# Patient Record
Sex: MALE | Race: WHITE | NOT HISPANIC OR LATINO | URBAN - METROPOLITAN AREA
[De-identification: names, ages, dates, MRNs, and addresses within clinical notes are randomized per-mention and may not be internally consistent; named-entity substitution may affect disease eponyms.]

---

## 2018-06-01 ENCOUNTER — OFFICE VISIT (OUTPATIENT)
Dept: INTERNAL MEDICINE | Facility: CLINIC | Age: 43
End: 2018-06-01

## 2018-06-01 VITALS
RESPIRATION RATE: 18 BRPM | BODY MASS INDEX: 27.9 KG/M2 | DIASTOLIC BLOOD PRESSURE: 88 MMHG | SYSTOLIC BLOOD PRESSURE: 122 MMHG | WEIGHT: 217.4 LBS | TEMPERATURE: 98.6 F | HEART RATE: 80 BPM | HEIGHT: 74 IN | OXYGEN SATURATION: 98 %

## 2018-06-01 DIAGNOSIS — E66.3 OVERWEIGHT (BMI 25.0-29.9): ICD-10-CM

## 2018-06-01 DIAGNOSIS — R00.2 HEART PALPITATIONS: ICD-10-CM

## 2018-06-01 DIAGNOSIS — R53.82 CHRONIC FATIGUE: ICD-10-CM

## 2018-06-01 DIAGNOSIS — R07.89 LEFT CHEST PRESSURE: ICD-10-CM

## 2018-06-01 DIAGNOSIS — K21.9 GASTROESOPHAGEAL REFLUX DISEASE WITHOUT ESOPHAGITIS: Primary | ICD-10-CM

## 2018-06-01 PROBLEM — K21.00 GASTROESOPHAGEAL REFLUX DISEASE WITH ESOPHAGITIS: Status: ACTIVE | Noted: 2018-06-01

## 2018-06-01 PROBLEM — J45.909 UNCOMPLICATED ASTHMA: Status: ACTIVE | Noted: 2018-06-01

## 2018-06-01 PROBLEM — Z87.442 HISTORY OF KIDNEY STONES: Status: ACTIVE | Noted: 2018-06-01

## 2018-06-01 PROBLEM — M50.30 DDD (DEGENERATIVE DISC DISEASE), CERVICAL: Status: ACTIVE | Noted: 2018-06-01

## 2018-06-01 PROBLEM — M54.12 CERVICAL RADICULOPATHY: Status: ACTIVE | Noted: 2018-06-01

## 2018-06-01 PROCEDURE — 93000 ELECTROCARDIOGRAM COMPLETE: CPT | Performed by: INTERNAL MEDICINE

## 2018-06-01 PROCEDURE — 99204 OFFICE O/P NEW MOD 45 MIN: CPT | Performed by: INTERNAL MEDICINE

## 2018-06-01 NOTE — PROGRESS NOTES
Gonzalez Denton is a 42 y.o. male, who presents with a chief complaint of   Chief Complaint   Patient presents with   • Establish Care   • Fatigue     2 x month        43 yo M here to establish care and all of his problems are new to me. He is a  at [employer] and works with [employer].     He has had had chest pains on the left side when he goes to sleep or has anxiety. He describes it as pressure. Feels like his heart is pounding.  He is fatigued as well and comes home tired and needs to nap. Work load has increased over the last 2 months. They last about 1-2 hours or less. Never worse with exertion. He does have RAD when he is sick.     He thinks that he sleeps well , but does sometime wake him up. He does have reoccurring heart burn as well when he has tacos or pizza. He takes Omeprazole as needed when he eats something that causes it to flair. He does have waterbrash occasionally.     He has DDD of his cervical spine with radiculopathy and had injections with Dr. Romero a few years that helped. He had x-rays, but no MRI.          The following portions of the patient's history were reviewed and updated as appropriate: allergies, current medications, past family history, past medical history, past social history, past surgical history and problem list.    Allergies: Patient has no known allergies.    Current Outpatient Prescriptions:   •  OMEPRAZOLE PO, Take  by mouth As Needed., Disp: , Rfl:   There are no discontinued medications.    Review of Systems   Constitutional: Positive for fatigue. Negative for chills and fever.   HENT: Negative for congestion and rhinorrhea.    Respiratory: Positive for chest tightness. Negative for cough, choking, shortness of breath and wheezing.    Cardiovascular: Positive for chest pain and palpitations.   Gastrointestinal: Negative for abdominal pain, diarrhea, nausea and vomiting.        Reflex when eating tomatoes and spicy foods like tacos     Genitourinary:  "Negative for difficulty urinating and dysuria.   Musculoskeletal: Negative for arthralgias.   Skin: Negative for rash.   Neurological: Negative for dizziness and headaches.   Hematological: Negative for adenopathy.   Psychiatric/Behavioral: Negative for sleep disturbance.             /88 (BP Location: Left arm, Patient Position: Sitting, Cuff Size: Adult)   Pulse 80   Temp 98.6 °F (37 °C) (Oral)   Resp 18   Ht 188 cm (74\")   Wt 98.6 kg (217 lb 6.4 oz)   SpO2 98%   BMI 27.91 kg/m²       Physical Exam   Constitutional: He is oriented to person, place, and time. He appears well-developed and well-nourished. No distress.   HENT:   Head: Normocephalic and atraumatic.   Right Ear: External ear normal.   Left Ear: External ear normal.   Mouth/Throat: Oropharynx is clear and moist. No oropharyngeal exudate.   Eyes: Conjunctivae are normal. Right eye exhibits no discharge. Left eye exhibits no discharge. No scleral icterus.   Neck: Neck supple.   Cardiovascular: Normal rate, regular rhythm and normal heart sounds.  Exam reveals no gallop and no friction rub.    No murmur heard.  Pulmonary/Chest: Effort normal and breath sounds normal. No respiratory distress. He has no wheezes. He has no rales.   Abdominal: Soft. Bowel sounds are normal. He exhibits no distension and no mass. There is no tenderness. There is no guarding.   Lymphadenopathy:     He has no cervical adenopathy.   Neurological: He is alert and oriented to person, place, and time.   Skin: Skin is warm. No rash noted.   Psychiatric: He has a normal mood and affect. His behavior is normal.   Nursing note and vitals reviewed.      ECG 12 Lead  Date/Time: 6/1/2018 8:54 AM  Performed by: ORLANDO MONTES  Authorized by: ORLANDO MONTES   Comparison: not compared with previous ECG   Previous ECG: no previous ECG available  Rhythm: sinus rhythm  Rate: normal  Conduction: conduction normal  ST Segments: ST segments normal  T Waves: T waves normal  QRS axis: " normal  Other: no other findings  Clinical impression: normal ECG            No results found for this or any previous visit.        Gonzalez was seen today for establish care and fatigue.    Diagnoses and all orders for this visit:    Gastroesophageal reflux disease without esophagitis  -     CBC & Differential  -     Comprehensive Metabolic Panel  -     Urinalysis With / Culture If Indicated - Urine, Clean Catch    Left chest pressure  -     ECG 12 Lead    Heart palpitations  -     ECG 12 Lead  -     CBC & Differential  -     Comprehensive Metabolic Panel  -     TSH Rfx On Abnormal To Free T4    Chronic fatigue  -     TSH Rfx On Abnormal To Free T4  -     Vitamin B12  -     Vitamin D 25 Hydroxy    Overweight (BMI 25.0-29.9)      Reviewed old records from Dr. Collins office as well as wellness check that he had at work. Lipid panel and fasting BG were normal in 2/26/18.    In terms of his chest pressure and palpations, I think this sounds as though it is related to anxiety, stress and possibly GERD. EKG is normal. Will trial PPI once per day for 6-8 weeks and check labs and see back. Hoping that this will get better with better self care. We set a plan for exercising and eat better and hoping that this will lead to better sleep. If still having, will pursue a treadmill stress test, although discussed with him today that this is low risk.     Spent 50% of 45 minutes in counseling regarding chest pain and palpitations workup as well as need for better self care for his fatigue. Will call with blood work once they are back.       Return in about 2 months (around 8/1/2018) for Recheck.    Chani Reynolds MD  06/01/2018

## 2018-06-01 NOTE — PATIENT INSTRUCTIONS
Food Choices for Gastroesophageal Reflux Disease, Adult  When you have gastroesophageal reflux disease (GERD), the foods you eat and your eating habits are very important. Choosing the right foods can help ease your discomfort.  What guidelines do I need to follow?  · Choose fruits, vegetables, whole grains, and low-fat dairy products.  · Choose low-fat meat, fish, and poultry.  · Limit fats such as oils, salad dressings, butter, nuts, and avocado.  · Keep a food diary. This helps you identify foods that cause symptoms.  · Avoid foods that cause symptoms. These may be different for everyone.  · Eat small meals often instead of 3 large meals a day.  · Eat your meals slowly, in a place where you are relaxed.  · Limit fried foods.  · Cook foods using methods other than frying.  · Avoid drinking alcohol.  · Avoid drinking large amounts of liquids with your meals.  · Avoid bending over or lying down until 2-3 hours after eating.  What foods are not recommended?  These are some foods and drinks that may make your symptoms worse:  Vegetables   Tomatoes. Tomato juice. Tomato and spaghetti sauce. Chili peppers. Onion and garlic. Horseradish.  Fruits   Oranges, grapefruit, and lemon (fruit and juice).  Meats   High-fat meats, fish, and poultry. This includes hot dogs, ribs, ham, sausage, salami, and ruff.  Dairy   Whole milk and chocolate milk. Sour cream. Cream. Butter. Ice cream. Cream cheese.  Drinks   Coffee and tea. Bubbly (carbonated) drinks or energy drinks.  Condiments   Hot sauce. Barbecue sauce.  Sweets/Desserts   Chocolate and cocoa. Donuts. Peppermint and spearmint.  Fats and Oils   High-fat foods. This includes French fries and potato chips.  Other   Vinegar. Strong spices. This includes black pepper, white pepper, red pepper, cayenne, boland powder, cloves, ginger, and chili powder.  The items listed above may not be a complete list of foods and drinks to avoid. Contact your dietitian for more information.    This information is not intended to replace advice given to you by your health care provider. Make sure you discuss any questions you have with your health care provider.  Document Released: 06/18/2013 Document Revised: 05/25/2017 Document Reviewed: 10/22/2014  Elsevier Interactive Patient Education © 2017 [employer]

## 2018-06-02 LAB
25(OH)D3+25(OH)D2 SERPL-MCNC: 21.4 NG/ML
ALBUMIN SERPL-MCNC: 4.7 G/DL (ref 3.5–5.2)
ALBUMIN/GLOB SERPL: 2.5 G/DL
ALP SERPL-CCNC: 97 U/L (ref 40–129)
ALT SERPL-CCNC: 20 U/L (ref 5–41)
APPEARANCE UR: CLEAR
AST SERPL-CCNC: 17 U/L (ref 5–40)
BACTERIA #/AREA URNS HPF: NORMAL /HPF
BASOPHILS # BLD AUTO: 0.07 10*3/MM3 (ref 0–0.2)
BASOPHILS NFR BLD AUTO: 1.1 % (ref 0–2)
BILIRUB SERPL-MCNC: 0.6 MG/DL (ref 0.2–1.2)
BILIRUB UR QL STRIP: NEGATIVE
BUN SERPL-MCNC: 17 MG/DL (ref 6–20)
BUN/CREAT SERPL: 18.3 (ref 7–25)
CALCIUM SERPL-MCNC: 9.9 MG/DL (ref 8.6–10.5)
CHLORIDE SERPL-SCNC: 103 MMOL/L (ref 98–107)
CO2 SERPL-SCNC: 29.7 MMOL/L (ref 22–29)
COLOR UR: YELLOW
CREAT SERPL-MCNC: 0.93 MG/DL (ref 0.76–1.27)
EOSINOPHIL # BLD AUTO: 0.21 10*3/MM3 (ref 0.1–0.3)
EOSINOPHIL NFR BLD AUTO: 3.3 % (ref 0–4)
EPI CELLS #/AREA URNS HPF: NORMAL /HPF
ERYTHROCYTE [DISTWIDTH] IN BLOOD BY AUTOMATED COUNT: 13.4 % (ref 11.5–14.5)
GFR SERPLBLD CREATININE-BSD FMLA CKD-EPI: 109 ML/MIN/1.73
GFR SERPLBLD CREATININE-BSD FMLA CKD-EPI: 90 ML/MIN/1.73
GLOBULIN SER CALC-MCNC: 1.9 GM/DL
GLUCOSE SERPL-MCNC: 84 MG/DL (ref 65–99)
GLUCOSE UR QL: NEGATIVE
HCT VFR BLD AUTO: 45.4 % (ref 42–52)
HGB BLD-MCNC: 15 G/DL (ref 14–18)
HGB UR QL STRIP: NEGATIVE
IMM GRANULOCYTES # BLD: 0.02 10*3/MM3 (ref 0–0.03)
IMM GRANULOCYTES NFR BLD: 0.3 % (ref 0–0.5)
KETONES UR QL STRIP: NEGATIVE
LEUKOCYTE ESTERASE UR QL STRIP: NEGATIVE
LYMPHOCYTES # BLD AUTO: 1.28 10*3/MM3 (ref 0.6–4.8)
LYMPHOCYTES NFR BLD AUTO: 20 % (ref 20–45)
MCH RBC QN AUTO: 29.1 PG (ref 27–31)
MCHC RBC AUTO-ENTMCNC: 33 G/DL (ref 31–37)
MCV RBC AUTO: 88 FL (ref 80–94)
MICRO URNS: NORMAL
MICRO URNS: NORMAL
MONOCYTES # BLD AUTO: 0.45 10*3/MM3 (ref 0–1)
MONOCYTES NFR BLD AUTO: 7 % (ref 3–8)
MUCOUS THREADS URNS QL MICRO: PRESENT /HPF
NEUTROPHILS # BLD AUTO: 4.36 10*3/MM3 (ref 1.5–8.3)
NEUTROPHILS NFR BLD AUTO: 68.3 % (ref 45–70)
NITRITE UR QL STRIP: NEGATIVE
NRBC BLD AUTO-RTO: 0 /100 WBC (ref 0–0)
PH UR STRIP: 5.5 [PH] (ref 5–7.5)
PLATELET # BLD AUTO: 200 10*3/MM3 (ref 140–500)
POTASSIUM SERPL-SCNC: 4.8 MMOL/L (ref 3.5–5.2)
PROT SERPL-MCNC: 6.6 G/DL (ref 6–8.5)
PROT UR QL STRIP: NORMAL
RBC # BLD AUTO: 5.16 10*6/MM3 (ref 4.7–6.1)
RBC #/AREA URNS HPF: NORMAL /HPF
SODIUM SERPL-SCNC: 142 MMOL/L (ref 136–145)
SP GR UR: 1.02 (ref 1–1.03)
TSH SERPL DL<=0.005 MIU/L-ACNC: 1.8 MIU/ML (ref 0.27–4.2)
URINALYSIS REFLEX: NORMAL
UROBILINOGEN UR STRIP-MCNC: 0.2 MG/DL (ref 0.2–1)
VIT B12 SERPL-MCNC: 419 PG/ML
WBC # BLD AUTO: 6.39 10*3/MM3 (ref 4.8–10.8)
WBC #/AREA URNS HPF: NORMAL /HPF

## 2018-06-06 NOTE — PROGRESS NOTES
Labs all look good including his cholesterol, blood counts, and kidney and liver function. His B12 and vitamin D were borderline low and I want him to work on eating better and can even take men's multivitamin once per day which may help get those levels in normal range. No special supplements needed at this time.

## 2018-08-03 ENCOUNTER — OFFICE VISIT (OUTPATIENT)
Dept: INTERNAL MEDICINE | Facility: CLINIC | Age: 43
End: 2018-08-03

## 2018-08-03 VITALS
HEART RATE: 71 BPM | BODY MASS INDEX: 26.95 KG/M2 | DIASTOLIC BLOOD PRESSURE: 80 MMHG | RESPIRATION RATE: 18 BRPM | OXYGEN SATURATION: 98 % | SYSTOLIC BLOOD PRESSURE: 120 MMHG | WEIGHT: 210 LBS | HEIGHT: 74 IN | TEMPERATURE: 98.6 F

## 2018-08-03 DIAGNOSIS — M47.812 FACET ARTHRITIS, DEGENERATIVE, CERVICAL SPINE: ICD-10-CM

## 2018-08-03 DIAGNOSIS — M54.2 CERVICAL MUSCLE PAIN: ICD-10-CM

## 2018-08-03 DIAGNOSIS — Z56.6 STRESS AT WORK: ICD-10-CM

## 2018-08-03 DIAGNOSIS — K21.9 GASTROESOPHAGEAL REFLUX DISEASE WITHOUT ESOPHAGITIS: Primary | ICD-10-CM

## 2018-08-03 DIAGNOSIS — M50.30 DDD (DEGENERATIVE DISC DISEASE), CERVICAL: ICD-10-CM

## 2018-08-03 PROCEDURE — 99214 OFFICE O/P EST MOD 30 MIN: CPT | Performed by: INTERNAL MEDICINE

## 2018-08-03 SDOH — HEALTH STABILITY - MENTAL HEALTH: OTHER PHYSICAL AND MENTAL STRAIN RELATED TO WORK: Z56.6

## 2018-08-03 NOTE — PROGRESS NOTES
"      Gonzalez Denton is a 42 y.o. male, who presents with a chief complaint of   Chief Complaint   Patient presents with   • Follow-up     10 wk f/u, lab results    • Heartburn       43 yo M here for follow up and doing well. He felt that the reflux medication helped some, but worse with stress still. Trying to exercise and eat better which has helped.     He does hold a lot stress in his neck lately. Saw Dr. Rea Romero several years ago and had x-rays from 2014 that showed DDD and facet arthropathy of the neck as well as disc space narrowing. Neck is \"tight\" with no tingling or numbness. No weakness. Does have pain radiating down into the top of his shoulder.          The following portions of the patient's history were reviewed and updated as appropriate: allergies, current medications, past family history, past medical history, past social history, past surgical history and problem list.    Allergies: Patient has no known allergies.    Current Outpatient Prescriptions:   •  OMEPRAZOLE PO, Take  by mouth As Needed., Disp: , Rfl:   There are no discontinued medications.    Review of Systems   Constitutional: Negative for chills, fatigue and fever.   Respiratory: Negative for cough and shortness of breath.    Gastrointestinal: Negative for abdominal pain, diarrhea and nausea.   Musculoskeletal: Positive for neck pain and neck stiffness.   Neurological: Negative for dizziness and headaches.   Psychiatric/Behavioral: The patient is nervous/anxious.              /80 (BP Location: Left arm, Patient Position: Sitting, Cuff Size: Large Adult)   Pulse 71   Temp 98.6 °F (37 °C) (Oral)   Resp 18   Ht 188 cm (74\")   Wt 95.3 kg (210 lb)   SpO2 98%   BMI 26.96 kg/m²       Physical Exam   Constitutional: He is oriented to person, place, and time. He appears well-developed and well-nourished. No distress.   HENT:   Head: Normocephalic and atraumatic.   Right Ear: External ear normal.   Left Ear: External ear normal. "   Mouth/Throat: Oropharynx is clear and moist. No oropharyngeal exudate.   Eyes: Conjunctivae are normal. Right eye exhibits no discharge. Left eye exhibits no discharge. No scleral icterus.   Neck: Neck supple.   Cardiovascular: Normal rate, regular rhythm and normal heart sounds.  Exam reveals no gallop and no friction rub.    No murmur heard.  Pulmonary/Chest: Effort normal and breath sounds normal. No respiratory distress. He has no wheezes. He has no rales.   Abdominal: He exhibits no mass.   Musculoskeletal:        Cervical back: He exhibits decreased range of motion, pain and spasm. He exhibits no tenderness and no bony tenderness.   Lymphadenopathy:     He has no cervical adenopathy.   Neurological: He is alert and oriented to person, place, and time. He displays no atrophy. No sensory deficit. He exhibits normal muscle tone. Coordination and gait normal.   Reflex Scores:       Tricep reflexes are 2+ on the right side and 2+ on the left side.       Bicep reflexes are 2+ on the right side and 2+ on the left side.       Brachioradialis reflexes are 2+ on the right side and 2+ on the left side.  Skin: Skin is warm. Capillary refill takes less than 2 seconds. No rash noted.   Psychiatric: He has a normal mood and affect. His behavior is normal.   Nursing note and vitals reviewed.        Results for orders placed or performed in visit on 06/01/18   Comprehensive Metabolic Panel   Result Value Ref Range    Glucose 84 65 - 99 mg/dL    BUN 17 6 - 20 mg/dL    Creatinine 0.93 0.76 - 1.27 mg/dL    eGFR Non African Am 90 >60 mL/min/1.73    eGFR African Am 109 >60 mL/min/1.73    BUN/Creatinine Ratio 18.3 7.0 - 25.0    Sodium 142 136 - 145 mmol/L    Potassium 4.8 3.5 - 5.2 mmol/L    Chloride 103 98 - 107 mmol/L    Total CO2 29.7 (H) 22.0 - 29.0 mmol/L    Calcium 9.9 8.6 - 10.5 mg/dL    Total Protein 6.6 6.0 - 8.5 g/dL    Albumin 4.70 3.50 - 5.20 g/dL    Globulin 1.9 gm/dL    A/G Ratio 2.5 g/dL    Total Bilirubin 0.6 0.2 -  1.2 mg/dL    Alkaline Phosphatase 97 40 - 129 U/L    AST (SGOT) 17 5 - 40 U/L    ALT (SGPT) 20 5 - 41 U/L   Urinalysis With / Culture If Indicated - Urine, Clean Catch   Result Value Ref Range    Specific Gravity, UA 1.025 1.005 - 1.030    pH, UA 5.5 5.0 - 7.5    Color, UA Yellow Yellow    Appearance, UA Clear Clear    Leukocytes, UA Negative Negative    Protein Trace Negative/Trace    Glucose, UA Negative Negative    Ketones Negative Negative    Blood, UA Negative Negative    Bilirubin, UA Negative Negative    Urobilinogen, UA 0.2 0.2 - 1.0 mg/dL    Nitrite, UA Negative Negative    Microscopic Examination Comment     MICROSCOPIC EXAMINATION See below:     Urinalysis Reflex Comment    TSH Rfx On Abnormal To Free T4   Result Value Ref Range    TSH 1.80 0.27 - 4.2 mIU/mL   Vitamin B12   Result Value Ref Range    Vitamin B-12 419 232-1,245 pg/mL   Vitamin D 25 Hydroxy   Result Value Ref Range    25 Hydroxy, Vitamin D 21.4 ng/ml   Microscopic Examination   Result Value Ref Range    WBC, UA 0-5 0 - 5 /hpf    RBC, UA 0-2 0 - 2 /hpf    Epithelial Cells (non renal) None seen 0 - 10 /hpf    Mucus, UA Present Not Estab. /hpf    Bacteria, UA None seen None seen/Few /hpf   CBC & Differential   Result Value Ref Range    WBC 6.39 4.80 - 10.80 10*3/mm3    RBC 5.16 4.70 - 6.10 10*6/mm3    Hemoglobin 15.0 14.0 - 18.0 g/dL    Hematocrit 45.4 42.0 - 52.0 %    MCV 88.0 80.0 - 94.0 fL    MCH 29.1 27.0 - 31.0 pg    MCHC 33.0 31.0 - 37.0 g/dL    RDW 13.4 11.5 - 14.5 %    Platelets 200 140 - 500 10*3/mm3    Neutrophil Rel % 68.3 45.0 - 70.0 %    Lymphocyte Rel % 20.0 20.0 - 45.0 %    Monocyte Rel % 7.0 3.0 - 8.0 %    Eosinophil Rel % 3.3 0.0 - 4.0 %    Basophil Rel % 1.1 0.0 - 2.0 %    Neutrophils Absolute 4.36 1.50 - 8.30 10*3/mm3    Lymphocytes Absolute 1.28 0.60 - 4.80 10*3/mm3    Monocytes Absolute 0.45 0.00 - 1.00 10*3/mm3    Eosinophils Absolute 0.21 0.10 - 0.30 10*3/mm3    Basophils Absolute 0.07 0.00 - 0.20 10*3/mm3    Immature  Granulocyte Rel % 0.3 0.0 - 0.5 %    Immature Grans Absolute 0.02 0.00 - 0.03 10*3/mm3    nRBC 0.0 0.0 - 0.0 /100 WBC           Gonzalez was seen today for follow-up and heartburn.    Diagnoses and all orders for this visit:    Gastroesophageal reflux disease without esophagitis    Stress at work    DDD (degenerative disc disease), cervical  -     Ambulatory Referral to Hospital Pain Management Department  -     Ambulatory Referral to Physical Therapy Evaluate and treat    Cervical muscle pain  -     Ambulatory Referral to Hospital Pain Management Department  -     Ambulatory Referral to Physical Therapy Evaluate and treat    Facet arthritis, degenerative, cervical spine  -     Ambulatory Referral to Hospital Pain Management Department  -     Ambulatory Referral to Physical Therapy Evaluate and treat      PT as well as pain management for his neck. He has seen Dr. Romero, but she doesn't see cervical spine issues anymore. X-rays are from 2014 and were reviewed and abnormal. Will up date x-rays if we need to. Sophia to arrange. PT ordered as well.     Continue to use PPI and work on stress level. He is doing better with this, but still needs to make some changes that I think will help.     Return in about 10 months (around 6/3/2019) for Annual physical, Recheck.    Chani Reynolds MD  08/03/2018

## 2019-05-30 DIAGNOSIS — Z00.00 ROUTINE ADULT HEALTH MAINTENANCE: Primary | ICD-10-CM

## 2019-05-30 DIAGNOSIS — R53.82 CHRONIC FATIGUE: ICD-10-CM

## 2019-05-30 DIAGNOSIS — Z13.29 SCREENING FOR THYROID DISORDER: ICD-10-CM

## 2019-05-30 DIAGNOSIS — Z13.220 SCREENING FOR HYPERLIPIDEMIA: ICD-10-CM

## 2019-05-31 ENCOUNTER — LAB (OUTPATIENT)
Dept: INTERNAL MEDICINE | Facility: CLINIC | Age: 44
End: 2019-05-31

## 2019-05-31 DIAGNOSIS — Z13.220 SCREENING FOR HYPERLIPIDEMIA: ICD-10-CM

## 2019-05-31 DIAGNOSIS — Z13.29 SCREENING FOR THYROID DISORDER: ICD-10-CM

## 2019-05-31 DIAGNOSIS — Z00.00 ROUTINE ADULT HEALTH MAINTENANCE: ICD-10-CM

## 2019-05-31 DIAGNOSIS — R53.82 CHRONIC FATIGUE: ICD-10-CM

## 2019-06-01 LAB
25(OH)D3+25(OH)D2 SERPL-MCNC: 20.4 NG/ML (ref 30–100)
ALBUMIN SERPL-MCNC: 4.8 G/DL (ref 3.5–5.2)
ALBUMIN/GLOB SERPL: 2.1 G/DL
ALP SERPL-CCNC: 90 U/L (ref 39–117)
ALT SERPL-CCNC: 27 U/L (ref 1–41)
APPEARANCE UR: CLEAR
AST SERPL-CCNC: 16 U/L (ref 1–40)
BACTERIA #/AREA URNS HPF: NORMAL /HPF
BASOPHILS # BLD AUTO: 0.07 10*3/MM3 (ref 0–0.2)
BASOPHILS NFR BLD AUTO: 1.1 % (ref 0–1.5)
BILIRUB SERPL-MCNC: 0.6 MG/DL (ref 0.2–1.2)
BILIRUB UR QL STRIP: NEGATIVE
BUN SERPL-MCNC: 14 MG/DL (ref 6–20)
BUN/CREAT SERPL: 16.1 (ref 7–25)
CALCIUM SERPL-MCNC: 10.3 MG/DL (ref 8.6–10.5)
CHLORIDE SERPL-SCNC: 104 MMOL/L (ref 98–107)
CHOLEST SERPL-MCNC: 192 MG/DL (ref 0–200)
CO2 SERPL-SCNC: 26.4 MMOL/L (ref 22–29)
COLOR UR: YELLOW
CREAT SERPL-MCNC: 0.87 MG/DL (ref 0.76–1.27)
EOSINOPHIL # BLD AUTO: 0.19 10*3/MM3 (ref 0–0.4)
EOSINOPHIL NFR BLD AUTO: 2.9 % (ref 0.3–6.2)
EPI CELLS #/AREA URNS HPF: NORMAL /HPF
ERYTHROCYTE [DISTWIDTH] IN BLOOD BY AUTOMATED COUNT: 13.8 % (ref 12.3–15.4)
GLOBULIN SER CALC-MCNC: 2.3 GM/DL
GLUCOSE SERPL-MCNC: 81 MG/DL (ref 65–99)
GLUCOSE UR QL: NEGATIVE
HCT VFR BLD AUTO: 45.1 % (ref 37.5–51)
HDLC SERPL-MCNC: 55 MG/DL (ref 40–60)
HGB BLD-MCNC: 14.1 G/DL (ref 13–17.7)
HGB UR QL STRIP: NEGATIVE
IMM GRANULOCYTES # BLD AUTO: 0.01 10*3/MM3 (ref 0–0.05)
IMM GRANULOCYTES NFR BLD AUTO: 0.2 % (ref 0–0.5)
KETONES UR QL STRIP: NEGATIVE
LDLC SERPL CALC-MCNC: 122 MG/DL (ref 0–100)
LDLC/HDLC SERPL: 2.22 {RATIO}
LEUKOCYTE ESTERASE UR QL STRIP: NEGATIVE
LYMPHOCYTES # BLD AUTO: 1.56 10*3/MM3 (ref 0.7–3.1)
LYMPHOCYTES NFR BLD AUTO: 23.6 % (ref 19.6–45.3)
MCH RBC QN AUTO: 28 PG (ref 26.6–33)
MCHC RBC AUTO-ENTMCNC: 31.3 G/DL (ref 31.5–35.7)
MCV RBC AUTO: 89.5 FL (ref 79–97)
MICRO URNS: NORMAL
MICRO URNS: NORMAL
MONOCYTES # BLD AUTO: 0.46 10*3/MM3 (ref 0.1–0.9)
MONOCYTES NFR BLD AUTO: 6.9 % (ref 5–12)
MUCOUS THREADS URNS QL MICRO: PRESENT /HPF
NEUTROPHILS # BLD AUTO: 4.33 10*3/MM3 (ref 1.7–7)
NEUTROPHILS NFR BLD AUTO: 65.3 % (ref 42.7–76)
NITRITE UR QL STRIP: NEGATIVE
PH UR STRIP: 5 [PH] (ref 5–7.5)
PLATELET # BLD AUTO: 197 10*3/MM3 (ref 140–450)
POTASSIUM SERPL-SCNC: 4.7 MMOL/L (ref 3.5–5.2)
PROT SERPL-MCNC: 7.1 G/DL (ref 6–8.5)
PROT UR QL STRIP: NEGATIVE
RBC # BLD AUTO: 5.04 10*6/MM3 (ref 4.14–5.8)
RBC #/AREA URNS HPF: NORMAL /HPF
SODIUM SERPL-SCNC: 141 MMOL/L (ref 136–145)
SP GR UR: 1.02 (ref 1–1.03)
TRIGL SERPL-MCNC: 75 MG/DL (ref 0–150)
TSH SERPL DL<=0.005 MIU/L-ACNC: 1.72 MIU/ML (ref 0.27–4.2)
URINALYSIS REFLEX: NORMAL
UROBILINOGEN UR STRIP-MCNC: 0.2 MG/DL (ref 0.2–1)
VIT B12 SERPL-MCNC: 443 PG/ML (ref 211–946)
VLDLC SERPL CALC-MCNC: 15 MG/DL
WBC # BLD AUTO: 6.62 10*3/MM3 (ref 3.4–10.8)
WBC #/AREA URNS HPF: NORMAL /HPF

## 2019-06-07 ENCOUNTER — OFFICE VISIT (OUTPATIENT)
Dept: INTERNAL MEDICINE | Facility: CLINIC | Age: 44
End: 2019-06-07

## 2019-06-07 VITALS
OXYGEN SATURATION: 99 % | SYSTOLIC BLOOD PRESSURE: 134 MMHG | HEIGHT: 74 IN | RESPIRATION RATE: 14 BRPM | BODY MASS INDEX: 27.34 KG/M2 | TEMPERATURE: 97.8 F | HEART RATE: 69 BPM | WEIGHT: 213 LBS | DIASTOLIC BLOOD PRESSURE: 84 MMHG

## 2019-06-07 DIAGNOSIS — E78.00 PURE HYPERCHOLESTEROLEMIA: ICD-10-CM

## 2019-06-07 DIAGNOSIS — R53.82 CHRONIC FATIGUE: ICD-10-CM

## 2019-06-07 DIAGNOSIS — R29.818 SUSPECTED SLEEP APNEA: ICD-10-CM

## 2019-06-07 DIAGNOSIS — E55.9 VITAMIN D DEFICIENCY: ICD-10-CM

## 2019-06-07 DIAGNOSIS — Z00.00 ENCOUNTER FOR ANNUAL PHYSICAL EXAM: Primary | ICD-10-CM

## 2019-06-07 PROCEDURE — 90471 IMMUNIZATION ADMIN: CPT | Performed by: INTERNAL MEDICINE

## 2019-06-07 PROCEDURE — 90715 TDAP VACCINE 7 YRS/> IM: CPT | Performed by: INTERNAL MEDICINE

## 2019-06-07 PROCEDURE — 99396 PREV VISIT EST AGE 40-64: CPT | Performed by: INTERNAL MEDICINE

## 2019-06-07 NOTE — PROGRESS NOTES
Patient Name: Gonzalez Roth is a 43 y.o. male presenting for Annual Exam (CPE, lab results)    He is feeling well other than fatigue. He does snore at night for years. Can fall asleep when he gets home because he is so exhausted. Not exercising regularly now, but is trying to be more active in the summer. Father with sleep apnea.     Eats fairly well. Drinks plenty of water.     Well Adult Physical   Patient here for a comprehensive physical exam.The patient reports problems - fatigue and snoring     Do you take any herbs or supplements that were not prescribed by a doctor? no   Are you taking calcium supplements? no   Are you taking aspirin daily? no    GChad Bertin 43 y.o. male who presents for an Annual Wellness Visit.  he has a history of   Patient Active Problem List   Diagnosis   • History of kidney stones   • DDD (degenerative disc disease), cervical   • Cervical radiculopathy   • Reactive airway disease without complication   • Gastroesophageal reflux disease without esophagitis   • Pure hypercholesterolemia        Health Habits:  Dental Exam. up to date  Eye Exam. up to date  Exercise: 0 times/week.  Current exercise activities include: none regularly    Tob use:N/A   Qualifies for lung Ca screening?N/A       The following portions of the patient's history were reviewed and updated as appropriate: allergies, current medications, past family history, past medical history, past social history, past surgical history and problem list.    Review of Systems   Constitutional: Positive for fatigue. Negative for chills and fever.   HENT: Negative for congestion and rhinorrhea.    Respiratory: Positive for apnea. Negative for cough and shortness of breath.    Cardiovascular: Positive for palpitations. Negative for chest pain.   Gastrointestinal: Negative for abdominal pain, diarrhea and nausea.   Genitourinary: Negative for difficulty urinating and dysuria.   Musculoskeletal: Negative for  "arthralgias.   Allergic/Immunologic: Negative for environmental allergies.   Neurological: Negative for dizziness and headaches.       Patient has no known allergies.      Current Outpatient Medications:   •  Multiple Vitamin (MULTI VITAMIN MENS PO), Take  by mouth Daily., Disp: , Rfl:   •  OMEPRAZOLE PO, Take  by mouth As Needed., Disp: , Rfl:     OBJECTIVE    /84 (BP Location: Left arm, Patient Position: Sitting, Cuff Size: Large Adult)   Pulse 69   Temp 97.8 °F (36.6 °C) (Oral)   Resp 14   Ht 188 cm (74\")   Wt 96.6 kg (213 lb)   SpO2 99%   BMI 27.35 kg/m²     Physical Exam   Constitutional: He is oriented to person, place, and time. He appears well-developed and well-nourished. No distress.   HENT:   Head: Normocephalic and atraumatic.   Right Ear: Hearing, tympanic membrane, external ear and ear canal normal.   Left Ear: Hearing, tympanic membrane, external ear and ear canal normal.   Nose: Nose normal.   Mouth/Throat: Uvula is midline, oropharynx is clear and moist and mucous membranes are normal. No oropharyngeal exudate, posterior oropharyngeal edema or posterior oropharyngeal erythema. Tonsils are 0 on the right. Tonsils are 0 on the left. No tonsillar exudate.   Eyes: Conjunctivae are normal. Right eye exhibits no discharge. Left eye exhibits no discharge. No scleral icterus.   Neck: Neck supple.   Cardiovascular: Normal rate, regular rhythm and normal heart sounds. Exam reveals no gallop and no friction rub.   No murmur heard.  Pulmonary/Chest: Effort normal and breath sounds normal. No respiratory distress. He has no wheezes. He has no rales.   Abdominal: Soft. Bowel sounds are normal. He exhibits no distension and no mass. There is no tenderness. There is no guarding.   Lymphadenopathy:     He has no cervical adenopathy.   Neurological: He is alert and oriented to person, place, and time. He exhibits normal muscle tone. Coordination normal.   Skin: Skin is warm. Capillary refill takes less " than 2 seconds. No rash noted.   Psychiatric: He has a normal mood and affect. His behavior is normal.   Nursing note and vitals reviewed.        ASSESSMENT AND PLAN  Tdap today and tolerated well.      I want Gonzalez to begin a progressive daily aerobic exercise program, follow a low fat, low cholesterol diet, attempt to lose weight, decrease or avoid alcohol intake, reduce salt in diet and cooking, reduce exposure to stress, improve dietary compliance, continue current healthy lifestyle patterns and return for routine annual checkups.     He wants to think about getting a sleep study which I think that he should. Want to talk to wife before committing.     Take MV for low vitamin D.      Keep working on diet and exercise for his HLD, LDL is minimally elevated. Will recheck in one year.     Gonzalez was seen today for annual exam.    Diagnoses and all orders for this visit:    Encounter for annual physical exam  -     Tdap Vaccine Greater Than or Equal To 6yo IM    Chronic fatigue    Suspected sleep apnea    Pure hypercholesterolemia    Vitamin D deficiency         Return in about 1 year (around 6/7/2020) for Recheck, Annual physical.

## 2020-06-12 ENCOUNTER — OFFICE VISIT (OUTPATIENT)
Dept: INTERNAL MEDICINE | Facility: CLINIC | Age: 45
End: 2020-06-12

## 2020-06-12 VITALS
OXYGEN SATURATION: 98 % | WEIGHT: 221 LBS | HEIGHT: 72 IN | TEMPERATURE: 97.5 F | SYSTOLIC BLOOD PRESSURE: 118 MMHG | HEART RATE: 70 BPM | BODY MASS INDEX: 29.93 KG/M2 | DIASTOLIC BLOOD PRESSURE: 82 MMHG

## 2020-06-12 DIAGNOSIS — Z00.00 ENCOUNTER FOR WELLNESS EXAMINATION IN ADULT: Primary | ICD-10-CM

## 2020-06-12 DIAGNOSIS — R06.83 SNORING: ICD-10-CM

## 2020-06-12 DIAGNOSIS — M50.30 DDD (DEGENERATIVE DISC DISEASE), CERVICAL: ICD-10-CM

## 2020-06-12 PROBLEM — E78.00 PURE HYPERCHOLESTEROLEMIA: Status: RESOLVED | Noted: 2019-06-07 | Resolved: 2020-06-12

## 2020-06-12 PROBLEM — J45.909 REACTIVE AIRWAY DISEASE WITHOUT COMPLICATION: Status: RESOLVED | Noted: 2018-06-01 | Resolved: 2020-06-12

## 2020-06-12 PROBLEM — Z87.442 HISTORY OF KIDNEY STONES: Status: RESOLVED | Noted: 2018-06-01 | Resolved: 2020-06-12

## 2020-06-12 PROCEDURE — 99396 PREV VISIT EST AGE 40-64: CPT | Performed by: NURSE PRACTITIONER

## 2020-06-12 RX ORDER — METHOCARBAMOL 500 MG/1
500 TABLET, FILM COATED ORAL 4 TIMES DAILY
Qty: 60 TABLET | Refills: 0 | Status: SHIPPED | OUTPATIENT
Start: 2020-06-12 | End: 2021-06-18 | Stop reason: SDUPTHER

## 2020-06-12 NOTE — PROGRESS NOTES
"Subjective    Gonzalez Denton is a 44 y.o. male presenting today for   Chief Complaint   Patient presents with   • Annual Exam       History of Present Illness     Gonzalez Denton presents today as a new patient to me to establish care.   Prior PCP was Chani Reynolds.  Patient Care Team:  Emely Naidu APRN as PCP - General (Family Medicine)  Dami Forbes MD as Consulting Physician (Urology)  Rea Romero MD as Consulting Physician (Anesthesiology)    Current/chronic health conditions include:    Patient Active Problem List   Diagnosis   • DDD (degenerative disc disease), cervical   • Cervical radiculopathy   • Gastroesophageal reflux disease without esophagitis         Current Outpatient Medications:   •  methocarbamol (Robaxin) 500 MG tablet, Take 1 tablet by mouth 4 (Four) Times a Day., Disp: 60 tablet, Rfl: 0  •  OMEPRAZOLE PO, Take  by mouth As Needed., Disp: , Rfl:     GERD - this has been chronic but intermittent for approx 2 yrs; takes OTC PPI PRN generally 2-3x/wk    DDD - lower cervical to mid-thoracic; intermittent; had PT in past; will use OTC NSAID and this provides some relief    Snoring - chronic; sleep study has been suggested in the past but never scheduled; no witnessed apnea; reports daytime energy levels have actually been improved since working from home w/ quarantine    New complaints today include: \"I feel pretty good.\"    Health Maintenance:  Dental - UTD  Eye Exam - UTD  Exercise - walking 1-3x/wk        The following portions of the patient's history were reviewed and updated as appropriate: allergies, current medications, problem list, past medical history, past surgical history, family history, and social history.     Review of Systems   Constitutional: Negative.    HENT: Negative.    Eyes: Negative.    Respiratory: Negative.    Cardiovascular: Negative.    Gastrointestinal: Negative.    Genitourinary: Negative.    Musculoskeletal: Positive for back pain and neck pain.   Skin: " "Negative.    Neurological: Negative.    Psychiatric/Behavioral: Positive for sleep disturbance.       Objective    Vitals:    06/12/20 1125   BP: 118/82   BP Location: Left arm   Pulse: 70   Temp: 97.5 °F (36.4 °C)   TempSrc: Oral   SpO2: 98%   Weight: 100 kg (221 lb)   Height: 182.9 cm (72\")     Body mass index is 29.97 kg/m².  Nursing notes and vitals reviewed.    Physical Exam   Constitutional: He is oriented to person, place, and time. He appears well-developed and well-nourished.   HENT:   Head: Normocephalic.   Right Ear: Hearing, tympanic membrane, external ear and ear canal normal.   Left Ear: Hearing, tympanic membrane, external ear and ear canal normal.   Nose: Nose normal. No mucosal edema or rhinorrhea.   Mouth/Throat: Uvula is midline, oropharynx is clear and moist and mucous membranes are normal. No tonsillar exudate.   Eyes: Pupils are equal, round, and reactive to light. Conjunctivae, EOM and lids are normal.   Neck: Normal range of motion. Neck supple. No thyroid mass and no thyromegaly present.   Cardiovascular: Regular rhythm, S1 normal, S2 normal and normal pulses. Exam reveals no gallop and no friction rub.   No murmur heard.  Pulmonary/Chest: Effort normal and breath sounds normal. He has no wheezes. He has no rhonchi. He has no rales.   Abdominal: Soft. Normal appearance and bowel sounds are normal. There is no hepatosplenomegaly. There is no tenderness. There is no guarding. No hernia.   Musculoskeletal: Normal range of motion. He exhibits no edema or deformity.   Lymphadenopathy:     He has no cervical adenopathy.   Neurological: He is alert and oriented to person, place, and time. He has normal strength and normal reflexes. No cranial nerve deficit or sensory deficit.   Skin: Skin is warm, dry and intact. No lesion and no rash noted.   Psychiatric: He has a normal mood and affect. His speech is normal and behavior is normal. Thought content normal. He is attentive.       Assessment and " Plan    Gonzalez was seen today for annual exam.    Diagnoses and all orders for this visit:    Encounter for wellness examination in adult  -     CBC & Differential  -     Comprehensive Metabolic Panel  -     Hepatitis C Antibody  -     Lipid Panel With / Chol / HDL Ratio  -     Thyroid Cascade Profile    DDD (degenerative disc disease), cervical  -     methocarbamol (Robaxin) 500 MG tablet; Take 1 tablet by mouth 4 (Four) Times a Day.    Snoring  -     Ambulatory Referral to Sleep Medicine      Fasting. Will have labs drawn today. Advised will send message through [employer] w/ result unless significantly abnl.  Preventative counseling completed.  Patient counseled regarding therapeutic lifestyle changes including improved nutrition, increased exercise, and weight loss.        Medications, including side effects, were discussed with the patient. Patient verbalized understanding.  The plan of care was discussed. All questions were answered. Patient verbalized understanding.        Return in about 1 year (around 6/12/2021) for Annual physical.

## 2020-06-13 LAB
ALBUMIN SERPL-MCNC: 4.8 G/DL (ref 4–5)
ALBUMIN/GLOB SERPL: 2.2 {RATIO} (ref 1.2–2.2)
ALP SERPL-CCNC: 95 IU/L (ref 39–117)
ALT SERPL-CCNC: 30 IU/L (ref 0–44)
AST SERPL-CCNC: 20 IU/L (ref 0–40)
BASOPHILS # BLD AUTO: 0.1 X10E3/UL (ref 0–0.2)
BASOPHILS NFR BLD AUTO: 1 %
BILIRUB SERPL-MCNC: 0.6 MG/DL (ref 0–1.2)
BUN SERPL-MCNC: 15 MG/DL (ref 6–24)
BUN/CREAT SERPL: 15 (ref 9–20)
CALCIUM SERPL-MCNC: 9.8 MG/DL (ref 8.7–10.2)
CHLORIDE SERPL-SCNC: 102 MMOL/L (ref 96–106)
CHOLEST SERPL-MCNC: 215 MG/DL (ref 100–199)
CHOLEST/HDLC SERPL: 3.8 RATIO (ref 0–5)
CO2 SERPL-SCNC: 27 MMOL/L (ref 20–29)
CREAT SERPL-MCNC: 0.97 MG/DL (ref 0.76–1.27)
EOSINOPHIL # BLD AUTO: 0.2 X10E3/UL (ref 0–0.4)
EOSINOPHIL NFR BLD AUTO: 3 %
ERYTHROCYTE [DISTWIDTH] IN BLOOD BY AUTOMATED COUNT: 13.3 % (ref 11.6–15.4)
GLOBULIN SER CALC-MCNC: 2.2 G/DL (ref 1.5–4.5)
GLUCOSE SERPL-MCNC: 79 MG/DL (ref 65–99)
HCT VFR BLD AUTO: 46.2 % (ref 37.5–51)
HCV AB S/CO SERPL IA: <0.1 S/CO RATIO (ref 0–0.9)
HDLC SERPL-MCNC: 56 MG/DL
HGB BLD-MCNC: 15.1 G/DL (ref 13–17.7)
IMM GRANULOCYTES # BLD AUTO: 0 X10E3/UL (ref 0–0.1)
IMM GRANULOCYTES NFR BLD AUTO: 0 %
LDLC SERPL CALC-MCNC: 135 MG/DL (ref 0–99)
LYMPHOCYTES # BLD AUTO: 1.8 X10E3/UL (ref 0.7–3.1)
LYMPHOCYTES NFR BLD AUTO: 25 %
MCH RBC QN AUTO: 28.6 PG (ref 26.6–33)
MCHC RBC AUTO-ENTMCNC: 32.7 G/DL (ref 31.5–35.7)
MCV RBC AUTO: 88 FL (ref 79–97)
MONOCYTES # BLD AUTO: 0.5 X10E3/UL (ref 0.1–0.9)
MONOCYTES NFR BLD AUTO: 7 %
NEUTROPHILS # BLD AUTO: 4.6 X10E3/UL (ref 1.4–7)
NEUTROPHILS NFR BLD AUTO: 64 %
PLATELET # BLD AUTO: 222 X10E3/UL (ref 150–450)
POTASSIUM SERPL-SCNC: 4.4 MMOL/L (ref 3.5–5.2)
PROT SERPL-MCNC: 7 G/DL (ref 6–8.5)
RBC # BLD AUTO: 5.28 X10E6/UL (ref 4.14–5.8)
SODIUM SERPL-SCNC: 141 MMOL/L (ref 134–144)
TRIGL SERPL-MCNC: 118 MG/DL (ref 0–149)
TSH SERPL DL<=0.005 MIU/L-ACNC: 1.99 UIU/ML (ref 0.45–4.5)
VLDLC SERPL CALC-MCNC: 24 MG/DL (ref 5–40)
WBC # BLD AUTO: 7.2 X10E3/UL (ref 3.4–10.8)

## 2020-06-30 ENCOUNTER — APPOINTMENT (OUTPATIENT)
Dept: SLEEP MEDICINE | Facility: HOSPITAL | Age: 45
End: 2020-06-30

## 2021-06-09 ENCOUNTER — TELEPHONE (OUTPATIENT)
Dept: INTERNAL MEDICINE | Facility: CLINIC | Age: 46
End: 2021-06-09

## 2021-06-10 DIAGNOSIS — Z00.00 ROUTINE HEALTH MAINTENANCE: Primary | ICD-10-CM

## 2021-06-12 LAB
25(OH)D3+25(OH)D2 SERPL-MCNC: 28.4 NG/ML (ref 30–100)
ALBUMIN SERPL-MCNC: 4.8 G/DL (ref 3.5–5.2)
ALBUMIN/GLOB SERPL: 2.2 G/DL
ALP SERPL-CCNC: 100 U/L (ref 39–117)
ALT SERPL-CCNC: 16 U/L (ref 1–41)
AST SERPL-CCNC: 12 U/L (ref 1–40)
BILIRUB SERPL-MCNC: 0.4 MG/DL (ref 0–1.2)
BUN SERPL-MCNC: 19 MG/DL (ref 6–20)
BUN/CREAT SERPL: 20.4 (ref 7–25)
CALCIUM SERPL-MCNC: 9.4 MG/DL (ref 8.6–10.5)
CHLORIDE SERPL-SCNC: 105 MMOL/L (ref 98–107)
CHOLEST SERPL-MCNC: 210 MG/DL (ref 0–200)
CHOLEST/HDLC SERPL: 3.75 {RATIO}
CO2 SERPL-SCNC: 24.8 MMOL/L (ref 22–29)
CREAT SERPL-MCNC: 0.93 MG/DL (ref 0.76–1.27)
ERYTHROCYTE [DISTWIDTH] IN BLOOD BY AUTOMATED COUNT: 13.9 % (ref 12.3–15.4)
GLOBULIN SER CALC-MCNC: 2.2 GM/DL
GLUCOSE SERPL-MCNC: 77 MG/DL (ref 65–99)
HCT VFR BLD AUTO: 45 % (ref 37.5–51)
HDLC SERPL-MCNC: 56 MG/DL (ref 40–60)
HGB BLD-MCNC: 14.2 G/DL (ref 13–17.7)
LDLC SERPL CALC-MCNC: 144 MG/DL (ref 0–100)
MCH RBC QN AUTO: 25.8 PG (ref 26.6–33)
MCHC RBC AUTO-ENTMCNC: 31.6 G/DL (ref 31.5–35.7)
MCV RBC AUTO: 81.7 FL (ref 79–97)
PLATELET # BLD AUTO: 248 10*3/MM3 (ref 140–450)
POTASSIUM SERPL-SCNC: 4.9 MMOL/L (ref 3.5–5.2)
PROT SERPL-MCNC: 7 G/DL (ref 6–8.5)
RBC # BLD AUTO: 5.51 10*6/MM3 (ref 4.14–5.8)
SODIUM SERPL-SCNC: 143 MMOL/L (ref 136–145)
TRIGL SERPL-MCNC: 54 MG/DL (ref 0–150)
TSH SERPL DL<=0.005 MIU/L-ACNC: 1.59 UIU/ML (ref 0.45–4.5)
VLDLC SERPL CALC-MCNC: 10 MG/DL (ref 5–40)
WBC # BLD AUTO: 6.54 10*3/MM3 (ref 3.4–10.8)

## 2021-06-18 ENCOUNTER — OFFICE VISIT (OUTPATIENT)
Dept: INTERNAL MEDICINE | Facility: CLINIC | Age: 46
End: 2021-06-18

## 2021-06-18 VITALS
RESPIRATION RATE: 18 BRPM | DIASTOLIC BLOOD PRESSURE: 76 MMHG | TEMPERATURE: 96.2 F | HEART RATE: 55 BPM | SYSTOLIC BLOOD PRESSURE: 120 MMHG | BODY MASS INDEX: 29.93 KG/M2 | OXYGEN SATURATION: 100 % | HEIGHT: 72 IN | WEIGHT: 221 LBS

## 2021-06-18 DIAGNOSIS — M50.30 DDD (DEGENERATIVE DISC DISEASE), CERVICAL: ICD-10-CM

## 2021-06-18 DIAGNOSIS — E55.9 VITAMIN D DEFICIENCY: ICD-10-CM

## 2021-06-18 DIAGNOSIS — Z00.00 ENCOUNTER FOR WELLNESS EXAMINATION IN ADULT: Primary | ICD-10-CM

## 2021-06-18 DIAGNOSIS — E78.00 PURE HYPERCHOLESTEROLEMIA: ICD-10-CM

## 2021-06-18 DIAGNOSIS — Z12.11 SCREENING FOR MALIGNANT NEOPLASM OF COLON: ICD-10-CM

## 2021-06-18 PROCEDURE — 99396 PREV VISIT EST AGE 40-64: CPT | Performed by: NURSE PRACTITIONER

## 2021-06-18 RX ORDER — METHOCARBAMOL 500 MG/1
500 TABLET, FILM COATED ORAL 4 TIMES DAILY
Qty: 60 TABLET | Refills: 1 | Status: SHIPPED | OUTPATIENT
Start: 2021-06-18 | End: 2023-02-12 | Stop reason: SDUPTHER

## 2021-06-18 NOTE — PROGRESS NOTES
"DAVID Roth is a 45 y.o. male presenting for Annual Exam    His current/chronic health conditions include:  Patient Active Problem List   Diagnosis   • DDD (degenerative disc disease), cervical   • Cervical radiculopathy   • Gastroesophageal reflux disease without esophagitis   • Pure hypercholesterolemia       Current Outpatient Medications on File Prior to Visit   Medication Sig   • OMEPRAZOLE PO Take  by mouth As Needed.   • [DISCONTINUED] methocarbamol (Robaxin) 500 MG tablet Take 1 tablet by mouth 4 (Four) Times a Day.     No current facility-administered medications on file prior to visit.            Health Habits:  Nutrition: \"I could eat better.\"  Exercise: intermittently   Current exercise activities include: walking    Screenings:  Dental Exam: UTD  Eye Exam: UTD  PSA: n/a  Colon Cancer: n/a; asymptomatic  Tob use: never      Complaints today include:  \"I feel fine. Still have my back pain that is on and off. Still my acid reflux on and off.\" Omeprazole PRN relieves this. He generally takes an OTC NSAID and stretch to relieve back pain.    The patient's allergies, current medications, problem list, past medical history, past family history, past medical history, and past social history were reviewed and updated as appropriate.    Review of Systems   Constitutional: Negative.    HENT: Negative.    Eyes: Negative.    Respiratory: Negative.    Cardiovascular: Negative.    Gastrointestinal: Negative.    Endocrine: Negative.    Genitourinary: Negative.    Musculoskeletal: Positive for back pain.   Skin: Negative.    Allergic/Immunologic: Negative.    Neurological: Negative.    Hematological: Negative.    Psychiatric/Behavioral: Negative.        OBJECTIVE    Vitals:    06/18/21 0824   BP: 120/76   Pulse: 55   Resp: 18   Temp: 96.2 °F (35.7 °C)   TempSrc: Temporal   SpO2: 100%   Weight: 100 kg (221 lb)   Height: 182.9 cm (72.01\")       BP Readings from Last 3 Encounters:   06/18/21 120/76   06/12/20 118/82 "   01/12/20 146/86       Wt Readings from Last 3 Encounters:   06/18/21 100 kg (221 lb)   06/12/20 100 kg (221 lb)   01/12/20 99.8 kg (220 lb)       Body mass index is 29.97 kg/m².  Nursing notes and vital signs reviewed.    Physical Exam  Constitutional:       General: He is not in acute distress.     Appearance: Normal appearance. He is well-developed.   HENT:      Head: Normocephalic.      Right Ear: Hearing, tympanic membrane, ear canal and external ear normal.      Left Ear: Hearing, tympanic membrane, ear canal and external ear normal.      Nose: Nose normal. No mucosal edema or rhinorrhea.      Mouth/Throat:      Mouth: Mucous membranes are moist.      Pharynx: Oropharynx is clear. Uvula midline.   Eyes:      General: Lids are normal.      Extraocular Movements: Extraocular movements intact.      Conjunctiva/sclera: Conjunctivae normal.      Pupils: Pupils are equal, round, and reactive to light.   Neck:      Thyroid: No thyroid mass or thyromegaly.   Cardiovascular:      Rate and Rhythm: Regular rhythm.      Pulses: Normal pulses.      Heart sounds: S1 normal and S2 normal. No murmur heard.   No friction rub. No gallop.    Pulmonary:      Effort: Pulmonary effort is normal.      Breath sounds: Normal breath sounds. No wheezing, rhonchi or rales.   Abdominal:      General: Bowel sounds are normal.      Palpations: Abdomen is soft.      Tenderness: There is no abdominal tenderness. There is no guarding.      Hernia: No hernia is present.   Musculoskeletal:         General: No deformity. Normal range of motion.      Cervical back: Normal range of motion and neck supple.   Lymphadenopathy:      Cervical: No cervical adenopathy.   Skin:     General: Skin is warm and dry.      Findings: No lesion or rash.   Neurological:      General: No focal deficit present.      Mental Status: He is alert and oriented to person, place, and time.      Cranial Nerves: No cranial nerve deficit.      Sensory: No sensory deficit.       Motor: Motor function is intact.      Coordination: Coordination is intact.      Gait: Gait normal.      Deep Tendon Reflexes: Reflexes are normal and symmetric.   Psychiatric:         Attention and Perception: He is attentive.         Mood and Affect: Mood and affect normal.         Speech: Speech normal.         Behavior: Behavior normal.         Thought Content: Thought content normal.           Recent Results (from the past 672 hour(s))   CBC (No Diff)    Collection Time: 06/11/21 10:43 AM    Specimen: Blood   Result Value Ref Range    WBC 6.54 3.40 - 10.80 10*3/mm3    RBC 5.51 4.14 - 5.80 10*6/mm3    Hemoglobin 14.2 13.0 - 17.7 g/dL    Hematocrit 45.0 37.5 - 51.0 %    MCV 81.7 79.0 - 97.0 fL    MCH 25.8 (L) 26.6 - 33.0 pg    MCHC 31.6 31.5 - 35.7 g/dL    RDW 13.9 12.3 - 15.4 %    Platelets 248 140 - 450 10*3/mm3   Comprehensive Metabolic Panel    Collection Time: 06/11/21 10:43 AM    Specimen: Blood   Result Value Ref Range    Glucose 77 65 - 99 mg/dL    BUN 19 6 - 20 mg/dL    Creatinine 0.93 0.76 - 1.27 mg/dL    eGFR Non African Am 88 >60 mL/min/1.73    eGFR African Am 107 >60 mL/min/1.73    BUN/Creatinine Ratio 20.4 7.0 - 25.0    Sodium 143 136 - 145 mmol/L    Potassium 4.9 3.5 - 5.2 mmol/L    Chloride 105 98 - 107 mmol/L    Total CO2 24.8 22.0 - 29.0 mmol/L    Calcium 9.4 8.6 - 10.5 mg/dL    Total Protein 7.0 6.0 - 8.5 g/dL    Albumin 4.80 3.50 - 5.20 g/dL    Globulin 2.2 gm/dL    A/G Ratio 2.2 g/dL    Total Bilirubin 0.4 0.0 - 1.2 mg/dL    Alkaline Phosphatase 100 39 - 117 U/L    AST (SGOT) 12 1 - 40 U/L    ALT (SGPT) 16 1 - 41 U/L   Lipid Panel With / Chol / HDL Ratio    Collection Time: 06/11/21 10:43 AM    Specimen: Blood   Result Value Ref Range    Total Cholesterol 210 (H) 0 - 200 mg/dL    Triglycerides 54 0 - 150 mg/dL    HDL Cholesterol 56 40 - 60 mg/dL    VLDL Cholesterol Shin 10 5 - 40 mg/dL    LDL Chol Calc ([facility]) 144 (H) 0 - 100 mg/dL    Chol/HDL Ratio 3.75    Thyroid Cascade Profile    Collection  Time: 06/11/21 10:43 AM    Specimen: Blood   Result Value Ref Range    TSH 1.590 0.450 - 4.500 uIU/mL   Vitamin D 25 Hydroxy    Collection Time: 06/11/21 10:43 AM    Specimen: Blood   Result Value Ref Range    25 Hydroxy, Vitamin D 28.4 (L) 30.0 - 100.0 ng/ml     Each of these results were discussed individually in detail with the patient.      ASSESSMENT AND PLAN    Diagnoses and all orders for this visit:    1. Encounter for wellness examination in adult (Primary)    2. Screening for malignant neoplasm of colon  Comments:  - Pt will be 46 in 3 mo. I will order cologuard now and he will complete this test after his birthday.  Orders:  -     Cologuard - Stool, Per Rectum; Future    3. Pure hypercholesterolemia  Comments:  - counseled re TLCs    4. Vitamin D deficiency  Comments:  - start QD 5000IU supp    5. DDD (degenerative disc disease), cervical  -     methocarbamol (Robaxin) 500 MG tablet; Take 1 tablet by mouth 4 (Four) Times a Day.  Dispense: 60 tablet; Refill: 1          Preventative counseling completed including relevant screenings, appropriate vaccinations, healthy nutrition, and appropriate physical activity.  Patient's Body mass index is 29.97 kg/m². indicating that he is overweight (BMI 25-29.9). Obesity-related health conditions include the following: dyslipidemias. Obesity is unchanged. BMI is is above average; BMI management plan is completed. We discussed portion control and increasing exercise..          Medications, including side effects, were discussed with the patient. Patient verbalized understanding.  The plan of care was discussed. All questions were answered. Patient verbalized understanding.        Return in about 1 year (around 6/18/2022) for Annual physical; w/ fasting labs one week prior to CPE next year., or sooner if needed.

## 2021-08-22 ENCOUNTER — HOSPITAL ENCOUNTER (EMERGENCY)
Facility: HOSPITAL | Age: 46
Discharge: HOME | End: 2021-08-22
Attending: EMERGENCY MEDICINE | Admitting: EMERGENCY MEDICINE

## 2021-08-22 ENCOUNTER — APPOINTMENT (OUTPATIENT)
Dept: CT IMAGING | Facility: HOSPITAL | Age: 46
End: 2021-08-22

## 2021-08-22 VITALS
DIASTOLIC BLOOD PRESSURE: 91 MMHG | TEMPERATURE: 97.9 F | RESPIRATION RATE: 18 BRPM | BODY MASS INDEX: 29.66 KG/M2 | HEIGHT: 72 IN | OXYGEN SATURATION: 97 % | HEART RATE: 59 BPM | SYSTOLIC BLOOD PRESSURE: 140 MMHG | WEIGHT: 219 LBS

## 2021-08-22 DIAGNOSIS — N20.0 KIDNEY STONE: Primary | ICD-10-CM

## 2021-08-22 LAB
ALBUMIN SERPL-MCNC: 4.2 G/DL (ref 3.5–5.2)
ALBUMIN/GLOB SERPL: 1.8 G/DL
ALP SERPL-CCNC: 120 U/L (ref 39–117)
ALT SERPL W P-5'-P-CCNC: 17 U/L (ref 1–41)
ANION GAP SERPL CALCULATED.3IONS-SCNC: 7.5 MMOL/L (ref 5–15)
AST SERPL-CCNC: 16 U/L (ref 1–40)
BASOPHILS # BLD AUTO: 0.1 10*3/MM3 (ref 0–0.2)
BASOPHILS NFR BLD AUTO: 0.9 % (ref 0–1.5)
BILIRUB SERPL-MCNC: 0.3 MG/DL (ref 0–1.2)
BILIRUB UR QL STRIP: NEGATIVE
BUN SERPL-MCNC: 18 MG/DL (ref 6–20)
BUN/CREAT SERPL: 16.4 (ref 7–25)
CALCIUM SPEC-SCNC: 9.4 MG/DL (ref 8.6–10.5)
CHLORIDE SERPL-SCNC: 102 MMOL/L (ref 98–107)
CLARITY UR: ABNORMAL
CO2 SERPL-SCNC: 29.5 MMOL/L (ref 22–29)
COLOR UR: YELLOW
CREAT SERPL-MCNC: 1.1 MG/DL (ref 0.76–1.27)
DEPRECATED RDW RBC AUTO: 46.3 FL (ref 37–54)
EOSINOPHIL # BLD AUTO: 0.32 10*3/MM3 (ref 0–0.4)
EOSINOPHIL NFR BLD AUTO: 2.9 % (ref 0.3–6.2)
ERYTHROCYTE [DISTWIDTH] IN BLOOD BY AUTOMATED COUNT: 14.9 % (ref 12.3–15.4)
GFR SERPL CREATININE-BSD FRML MDRD: 73 ML/MIN/1.73
GLOBULIN UR ELPH-MCNC: 2.3 GM/DL
GLUCOSE SERPL-MCNC: 115 MG/DL (ref 65–99)
GLUCOSE UR STRIP-MCNC: NEGATIVE MG/DL
HCT VFR BLD AUTO: 42.3 % (ref 37.5–51)
HGB BLD-MCNC: 13.5 G/DL (ref 13–17.7)
HGB UR QL STRIP.AUTO: NEGATIVE
IMM GRANULOCYTES # BLD AUTO: 0.02 10*3/MM3 (ref 0–0.05)
IMM GRANULOCYTES NFR BLD AUTO: 0.2 % (ref 0–0.5)
KETONES UR QL STRIP: NEGATIVE
LEUKOCYTE ESTERASE UR QL STRIP.AUTO: NEGATIVE
LYMPHOCYTES # BLD AUTO: 1.76 10*3/MM3 (ref 0.7–3.1)
LYMPHOCYTES NFR BLD AUTO: 16.2 % (ref 19.6–45.3)
MCH RBC QN AUTO: 27.2 PG (ref 26.6–33)
MCHC RBC AUTO-ENTMCNC: 31.9 G/DL (ref 31.5–35.7)
MCV RBC AUTO: 85.3 FL (ref 79–97)
MONOCYTES # BLD AUTO: 0.9 10*3/MM3 (ref 0.1–0.9)
MONOCYTES NFR BLD AUTO: 8.3 % (ref 5–12)
NEUTROPHILS NFR BLD AUTO: 7.75 10*3/MM3 (ref 1.7–7)
NEUTROPHILS NFR BLD AUTO: 71.5 % (ref 42.7–76)
NITRITE UR QL STRIP: NEGATIVE
NRBC BLD AUTO-RTO: 0 /100 WBC (ref 0–0.2)
PH UR STRIP.AUTO: 7 [PH] (ref 4.5–8)
PLATELET # BLD AUTO: 196 10*3/MM3 (ref 140–450)
PMV BLD AUTO: 12.2 FL (ref 6–12)
POTASSIUM SERPL-SCNC: 3.8 MMOL/L (ref 3.5–5.2)
PROT SERPL-MCNC: 6.5 G/DL (ref 6–8.5)
PROT UR QL STRIP: NEGATIVE
RBC # BLD AUTO: 4.96 10*6/MM3 (ref 4.14–5.8)
SODIUM SERPL-SCNC: 139 MMOL/L (ref 136–145)
SP GR UR STRIP: 1.02 (ref 1–1.03)
UROBILINOGEN UR QL STRIP: ABNORMAL
WBC # BLD AUTO: 10.85 10*3/MM3 (ref 3.4–10.8)

## 2021-08-22 PROCEDURE — 80053 COMPREHEN METABOLIC PANEL: CPT | Performed by: EMERGENCY MEDICINE

## 2021-08-22 PROCEDURE — 96375 TX/PRO/DX INJ NEW DRUG ADDON: CPT

## 2021-08-22 PROCEDURE — 99283 EMERGENCY DEPT VISIT LOW MDM: CPT | Performed by: EMERGENCY MEDICINE

## 2021-08-22 PROCEDURE — 74176 CT ABD & PELVIS W/O CONTRAST: CPT

## 2021-08-22 PROCEDURE — 25010000002 MORPHINE PER 10 MG: Performed by: EMERGENCY MEDICINE

## 2021-08-22 PROCEDURE — 81003 URINALYSIS AUTO W/O SCOPE: CPT | Performed by: EMERGENCY MEDICINE

## 2021-08-22 PROCEDURE — 96374 THER/PROPH/DIAG INJ IV PUSH: CPT

## 2021-08-22 PROCEDURE — 25010000002 ONDANSETRON PER 1 MG: Performed by: EMERGENCY MEDICINE

## 2021-08-22 PROCEDURE — 25010000002 HYDROMORPHONE PER 4 MG: Performed by: EMERGENCY MEDICINE

## 2021-08-22 PROCEDURE — 25010000002 KETOROLAC TROMETHAMINE PER 15 MG: Performed by: EMERGENCY MEDICINE

## 2021-08-22 PROCEDURE — 99283 EMERGENCY DEPT VISIT LOW MDM: CPT

## 2021-08-22 PROCEDURE — 85025 COMPLETE CBC W/AUTO DIFF WBC: CPT | Performed by: EMERGENCY MEDICINE

## 2021-08-22 RX ORDER — HYDROMORPHONE HCL 110MG/55ML
0.5 PATIENT CONTROLLED ANALGESIA SYRINGE INTRAVENOUS ONCE
Status: COMPLETED | OUTPATIENT
Start: 2021-08-22 | End: 2021-08-22

## 2021-08-22 RX ORDER — SODIUM CHLORIDE 0.9 % (FLUSH) 0.9 %
10 SYRINGE (ML) INJECTION AS NEEDED
Status: DISCONTINUED | OUTPATIENT
Start: 2021-08-22 | End: 2021-08-22 | Stop reason: HOSPADM

## 2021-08-22 RX ORDER — HYDROMORPHONE HCL 110MG/55ML
1 PATIENT CONTROLLED ANALGESIA SYRINGE INTRAVENOUS ONCE
Status: DISCONTINUED | OUTPATIENT
Start: 2021-08-22 | End: 2021-08-22

## 2021-08-22 RX ORDER — ONDANSETRON 4 MG/1
4 TABLET, FILM COATED ORAL EVERY 8 HOURS PRN
Qty: 20 TABLET | Refills: 0 | Status: SHIPPED | OUTPATIENT
Start: 2021-08-22 | End: 2021-08-29

## 2021-08-22 RX ORDER — HYDROCODONE BITARTRATE AND ACETAMINOPHEN 5; 325 MG/1; MG/1
1 TABLET ORAL EVERY 8 HOURS PRN
Qty: 15 TABLET | Refills: 0 | Status: SHIPPED | OUTPATIENT
Start: 2021-08-22 | End: 2021-08-27

## 2021-08-22 RX ORDER — TAMSULOSIN HYDROCHLORIDE 0.4 MG/1
1 CAPSULE ORAL DAILY
Qty: 5 CAPSULE | Refills: 0 | Status: SHIPPED | OUTPATIENT
Start: 2021-08-22 | End: 2021-08-27

## 2021-08-22 RX ORDER — KETOROLAC TROMETHAMINE 30 MG/ML
30 INJECTION, SOLUTION INTRAMUSCULAR; INTRAVENOUS ONCE
Status: COMPLETED | OUTPATIENT
Start: 2021-08-22 | End: 2021-08-22

## 2021-08-22 RX ORDER — HYDROCODONE BITARTRATE AND ACETAMINOPHEN 5; 325 MG/1; MG/1
1 TABLET ORAL EVERY 6 HOURS PRN
COMMUNITY
End: 2021-09-23

## 2021-08-22 RX ORDER — ONDANSETRON 2 MG/ML
4 INJECTION INTRAMUSCULAR; INTRAVENOUS ONCE
Status: COMPLETED | OUTPATIENT
Start: 2021-08-22 | End: 2021-08-22

## 2021-08-22 RX ADMIN — KETOROLAC TROMETHAMINE 30 MG: 30 INJECTION, SOLUTION INTRAMUSCULAR; INTRAVENOUS at 02:21

## 2021-08-22 RX ADMIN — HYDROMORPHONE HYDROCHLORIDE 0.5 MG: 2 INJECTION, SOLUTION INTRAMUSCULAR; INTRAVENOUS; SUBCUTANEOUS at 03:35

## 2021-08-22 RX ADMIN — ONDANSETRON 4 MG: 2 INJECTION INTRAMUSCULAR; INTRAVENOUS at 02:21

## 2021-08-22 RX ADMIN — MORPHINE SULFATE 4 MG: 4 INJECTION INTRAVENOUS at 02:21

## 2021-08-22 NOTE — ED PROVIDER NOTES
Subjective   History of Present Illness  History of Present Illness    Chief complaint: Flank pain, nausea    Location: Right flank area    Quality/Severity: Moderate sharp pain    Timing/Duration: Started this evening around 11 PM    Modifying Factors: None    Narrative: This patient presents for evaluation of new onset right-sided flank pain with some nausea.  He has had several kidney stones in the past and he says this feels very similar to kidney stone problems he is experienced before.  He has been cared for by doctors and the [facility] has required lithotripsy in the past.  He has not had any vomiting so far.  He has not had any fevers or chills either.  He has not experienced any hematuria get either.    Associated Symptoms: As above    Review of Systems   Constitutional: Negative for activity change, diaphoresis and fever.   HENT: Negative.    Respiratory: Negative for cough and shortness of breath.    Cardiovascular: Negative for chest pain.   Gastrointestinal: Positive for nausea. Negative for abdominal pain and vomiting.   Genitourinary: Positive for flank pain. Negative for dysuria, frequency and hematuria.   Skin: Negative for color change.   Neurological: Negative for syncope and headaches.   All other systems reviewed and are negative.      Past Medical History:   Diagnosis Date   • GERD (gastroesophageal reflux disease)    • History of kidney stones 6/1/2018    5mm stone that caused hydronephrosis requiring lithotripsy and uretal stent placement 2008 and multiple ones since that time requiring lithotripsy. He sees Dr. Forbes once per year    • Kidney stone        No Known Allergies    Past Surgical History:   Procedure Laterality Date   • ADENOIDECTOMY     • CYSTOSCOPY W/ LASER LITHOTRIPSY      Sees Dr. Forbes yearly    • TONSILLECTOMY     • VASECTOMY         Family History   Problem Relation Age of Onset   • Hypertension Mother    • Ovarian cancer Mother 54   • Hypertension  Father    • Gout Father    • Asthma Father    • Depression Father    • Alcohol abuse Brother    • Mental illness Brother    • Esophageal cancer Paternal Uncle    • Skin cancer Maternal Grandmother    • Heart failure Maternal Grandmother    • Asthma Paternal Grandmother    • Diabetes Paternal Grandfather    • Stroke Paternal Grandfather    • Mental illness Brother    • Pancreatic cancer Maternal Grandfather    • Diabetes type I Son        Social History     Socioeconomic History   • Marital status:      Spouse name: Not on file   • Number of children: Not on file   • Years of education: Not on file   • Highest education level: Not on file   Tobacco Use   • Smoking status: Never Smoker   • Smokeless tobacco: Never Used   Substance and Sexual Activity   • Alcohol use: Yes     Alcohol/week: 1.0 standard drinks     Types: 1 Standard drinks or equivalent per week     Comment: occ   • Drug use: Defer   • Sexual activity: Yes       ED Triage Vitals [08/22/21 0213]   Temp Heart Rate Resp BP SpO2   97.9 °F (36.6 °C) 63 20 (!) 152/103 95 %      Temp src Heart Rate Source Patient Position BP Location FiO2 (%)   Oral Monitor Lying Right arm --         Objective   Physical Exam  Vitals and nursing note reviewed.   Constitutional:       Appearance: He is well-developed. He is not toxic-appearing or diaphoretic.      Comments: Appears uncomfortable but not in significant distress.   HENT:      Head: Normocephalic and atraumatic.   Eyes:      General:         Right eye: No discharge.         Left eye: No discharge.      Pupils: Pupils are equal, round, and reactive to light.   Cardiovascular:      Rate and Rhythm: Normal rate and regular rhythm.      Pulses: Normal pulses.   Pulmonary:      Effort: Pulmonary effort is normal. No respiratory distress.      Breath sounds: No stridor.   Abdominal:      Tenderness: There is right CVA tenderness. There is no guarding or rebound.   Musculoskeletal:         General: No deformity.  Normal range of motion.      Cervical back: Normal range of motion and neck supple.      Right lower leg: No edema.      Left lower leg: No edema.   Skin:     General: Skin is warm and dry.      Findings: No erythema or rash.   Neurological:      General: No focal deficit present.      Mental Status: He is alert and oriented to person, place, and time.   Psychiatric:         Behavior: Behavior normal.         Thought Content: Thought content normal.         Judgment: Judgment normal.       Results for orders placed or performed during the hospital encounter of 08/22/21   Urinalysis With Culture If Indicated - Urine, Clean Catch    Specimen: Urine, Clean Catch   Result Value Ref Range    Color, UA Yellow Yellow, Straw    Appearance, UA Cloudy (A) Clear    pH, UA 7.0 4.5 - 8.0    Specific Gravity, UA 1.020 1.003 - 1.030    Glucose, UA Negative Negative    Ketones, UA Negative Negative    Bilirubin, UA Negative Negative    Blood, UA Negative Negative    Protein, UA Negative Negative    Leuk Esterase, UA Negative Negative    Nitrite, UA Negative Negative    Urobilinogen, UA 0.2 E.U./dL 0.2 - 1.0 E.U./dL   Comprehensive Metabolic Panel    Specimen: Blood   Result Value Ref Range    Glucose 115 (H) 65 - 99 mg/dL    BUN 18 6 - 20 mg/dL    Creatinine 1.10 0.76 - 1.27 mg/dL    Sodium 139 136 - 145 mmol/L    Potassium 3.8 3.5 - 5.2 mmol/L    Chloride 102 98 - 107 mmol/L    CO2 29.5 (H) 22.0 - 29.0 mmol/L    Calcium 9.4 8.6 - 10.5 mg/dL    Total Protein 6.5 6.0 - 8.5 g/dL    Albumin 4.20 3.50 - 5.20 g/dL    ALT (SGPT) 17 1 - 41 U/L    AST (SGOT) 16 1 - 40 U/L    Alkaline Phosphatase 120 (H) 39 - 117 U/L    Total Bilirubin 0.3 0.0 - 1.2 mg/dL    eGFR Non African Amer 73 >60 mL/min/1.73    Globulin 2.3 gm/dL    A/G Ratio 1.8 g/dL    BUN/Creatinine Ratio 16.4 7.0 - 25.0    Anion Gap 7.5 5.0 - 15.0 mmol/L   CBC Auto Differential    Specimen: Blood   Result Value Ref Range    WBC 10.85 (H) 3.40 - 10.80 10*3/mm3    RBC 4.96 4.14 -  "5.80 10*6/mm3    Hemoglobin 13.5 13.0 - 17.7 g/dL    Hematocrit 42.3 37.5 - 51.0 %    MCV 85.3 79.0 - 97.0 fL    MCH 27.2 26.6 - 33.0 pg    MCHC 31.9 31.5 - 35.7 g/dL    RDW 14.9 12.3 - 15.4 %    RDW-SD 46.3 37.0 - 54.0 fl    MPV 12.2 (H) 6.0 - 12.0 fL    Platelets 196 140 - 450 10*3/mm3    Neutrophil % 71.5 42.7 - 76.0 %    Lymphocyte % 16.2 (L) 19.6 - 45.3 %    Monocyte % 8.3 5.0 - 12.0 %    Eosinophil % 2.9 0.3 - 6.2 %    Basophil % 0.9 0.0 - 1.5 %    Immature Grans % 0.2 0.0 - 0.5 %    Neutrophils, Absolute 7.75 (H) 1.70 - 7.00 10*3/mm3    Lymphocytes, Absolute 1.76 0.70 - 3.10 10*3/mm3    Monocytes, Absolute 0.90 0.10 - 0.90 10*3/mm3    Eosinophils, Absolute 0.32 0.00 - 0.40 10*3/mm3    Basophils, Absolute 0.10 0.00 - 0.20 10*3/mm3    Immature Grans, Absolute 0.02 0.00 - 0.05 10*3/mm3    nRBC 0.0 0.0 - 0.2 /100 WBC       RADIOLOGY        Study: CT abdomen and pelvis without contrast    Findings: 1. Mild right hydronephrosis due to a 2 mm stone in the distal ureter just above the UVJ.  2. Tiny bilateral nonobstructing renal stones.  3. Potential mild small bowel ileus. The colon and appendix are normal.       Interpreted contemporaneously with treatment by Dr. Contreras, independently viewed by me    Procedures           ED Course  ED Course as of Aug 22 0634   Sun Aug 22, 2021   0632 I reviewed the labs and CT from today's visit.  CT demonstrates a 2 mm stone just above the UVJ.  This explains the patient's sudden pain perfectly well tonight.  He is feeling better after some IV medications here.  I expect that this stone should pass spontaneously without much difficulty.  Will prescribe for him the typical combination of Norco and Zofran and Flomax for the next several days.  Will advise him to follow-up with his already established urologist provider this week.  Will review with him the usual \"return to ER\" instructions as well.    [XU]      ED Course User Index  [XU] Kobe Hawkins MD                          "                  MDM  Number of Diagnoses or Management Options     Amount and/or Complexity of Data Reviewed  Clinical lab tests: reviewed and ordered  Tests in the radiology section of CPT®: reviewed and ordered  Independent visualization of images, tracings, or specimens: yes    Risk of Complications, Morbidity, and/or Mortality  Presenting problems: moderate  Diagnostic procedures: moderate  Management options: moderate        Final diagnoses:   Kidney stone       ED Disposition  ED Disposition     ED Disposition Condition Comment    Discharge Stable           [facility]    Call in 1 day  Call your urology doctor tomorrow to schedule a prompt follow-up appointment for further evaluation as we discussed.         Medication List      New Prescriptions    ondansetron 4 MG tablet  Commonly known as: ZOFRAN  Take 1 tablet by mouth Every 8 (Eight) Hours As Needed for Nausea or Vomiting for up to 7 days.     tamsulosin 0.4 MG capsule 24 hr capsule  Commonly known as: FLOMAX  Take 1 capsule by mouth Daily for 5 days.        Changed    * HYDROcodone-acetaminophen 5-325 MG per tablet  Commonly known as: NORCO  What changed: Another medication with the same name was added. Make sure you understand how and when to take each.     * HYDROcodone-acetaminophen 5-325 MG per tablet  Commonly known as: NORCO  Take 1 tablet by mouth Every 8 (Eight) Hours As Needed for Severe Pain  for up to 5 days.  What changed: You were already taking a medication with the same name, and this prescription was added. Make sure you understand how and when to take each.         * This list has 2 medication(s) that are the same as other medications prescribed for you. Read the directions carefully, and ask your doctor or other care provider to review them with you.               Where to Get Your Medications      These medications were sent to [facility] [address] [place] [address] [address] [facility] - [phone] PH - [phone]   [address] [address]    Phone: [phone]   · HYDROcodone-acetaminophen 5-325 MG per tablet  · ondansetron 4 MG tablet  · tamsulosin 0.4 MG capsule 24 hr capsule          Kobe Hawkins MD  08/22/21 0678

## 2021-08-25 ENCOUNTER — HOSPITAL ENCOUNTER (OUTPATIENT)
Dept: ULTRASOUND IMAGING | Facility: HOSPITAL | Age: 46
Discharge: HOME | End: 2021-08-25
Admitting: NURSE PRACTITIONER

## 2021-08-25 ENCOUNTER — OFFICE VISIT (OUTPATIENT)
Dept: INTERNAL MEDICINE | Facility: CLINIC | Age: 46
End: 2021-08-25

## 2021-08-25 VITALS
TEMPERATURE: 98.4 F | DIASTOLIC BLOOD PRESSURE: 80 MMHG | BODY MASS INDEX: 29.99 KG/M2 | WEIGHT: 221.4 LBS | HEIGHT: 72 IN | SYSTOLIC BLOOD PRESSURE: 132 MMHG | OXYGEN SATURATION: 98 % | HEART RATE: 102 BPM

## 2021-08-25 DIAGNOSIS — M25.572 ACUTE LEFT ANKLE PAIN: Primary | ICD-10-CM

## 2021-08-25 DIAGNOSIS — R60.0 EDEMA OF LEFT LOWER EXTREMITY: ICD-10-CM

## 2021-08-25 DIAGNOSIS — M25.572 ACUTE LEFT ANKLE PAIN: ICD-10-CM

## 2021-08-25 PROCEDURE — 99213 OFFICE O/P EST LOW 20 MIN: CPT | Performed by: NURSE PRACTITIONER

## 2021-08-25 PROCEDURE — 93971 EXTREMITY STUDY: CPT

## 2021-08-25 NOTE — PROGRESS NOTES
"Chief Complaint   Patient presents with   • Nephrolithiasis     low grade fever 99.9 last night with chills   • Ankle Pain   • Fever       Subjective     Gonzalez Denton is a 45 y.o. male being seen for an acute visit for left ankle pain since yesterday morning. He is reporting \"sharp pain on top of foot.\" He woke up with this after being treated for a kidney stone. Associated swelling and discoloration. He denies any known injury. He tried advil. The pain worse with any weight bearing, but also occurs at rest.       He was recently treated in the ER for a right 2mm kidney stone and he was sent home on Lortab. He is followed by Dr. Forbes. He denies difficulty urinating, blood in urine.   ED with Kobe Hawkins MD (08/22/2021)      History of Present Illness     No Known Allergies      Current Outpatient Medications:   •  HYDROcodone-acetaminophen (NORCO) 5-325 MG per tablet, Take 1 tablet by mouth Every 8 (Eight) Hours As Needed for Severe Pain  for up to 5 days., Disp: 15 tablet, Rfl: 0  •  methocarbamol (Robaxin) 500 MG tablet, Take 1 tablet by mouth 4 (Four) Times a Day., Disp: 60 tablet, Rfl: 1  •  OMEPRAZOLE PO, Take  by mouth As Needed., Disp: , Rfl:   •  ondansetron (ZOFRAN) 4 MG tablet, Take 1 tablet by mouth Every 8 (Eight) Hours As Needed for Nausea or Vomiting for up to 7 days., Disp: 20 tablet, Rfl: 0  •  tamsulosin (FLOMAX) 0.4 MG capsule 24 hr capsule, Take 1 capsule by mouth Daily for 5 days., Disp: 5 capsule, Rfl: 0  •  HYDROcodone-acetaminophen (NORCO) 5-325 MG per tablet, Take 1 tablet by mouth Every 6 (Six) Hours As Needed., Disp: , Rfl:     The following portions of the patient's history were reviewed and updated as appropriate: allergies, current medications, past family history, past medical history, past social history, past surgical history and problem list.    Review of Systems   Constitutional: Negative.    HENT: Negative.    Eyes: Negative.  Negative for visual disturbance.   Respiratory: " Negative.    Cardiovascular: Negative.  Negative for chest pain and leg swelling.   Gastrointestinal: Negative.    Endocrine: Negative.    Genitourinary: Negative.    Musculoskeletal: Positive for arthralgias.   Skin: Negative.    Allergic/Immunologic: Negative.  Negative for environmental allergies, food allergies and immunocompromised state.   Neurological: Negative.  Negative for tremors and weakness.   Hematological: Negative.  Negative for adenopathy. Does not bruise/bleed easily.   Psychiatric/Behavioral: Negative.    All other systems reviewed and are negative.      Assessment     Physical Exam  Vitals reviewed.   Constitutional:       Appearance: Normal appearance. He is not ill-appearing.   HENT:      Head: Normocephalic.   Cardiovascular:      Rate and Rhythm: Normal rate and regular rhythm.      Pulses: Normal pulses.      Heart sounds: Normal heart sounds. No murmur heard.     Pulmonary:      Effort: Pulmonary effort is normal.      Breath sounds: Normal breath sounds.   Musculoskeletal:         General: No swelling.      Left lower le+ Edema present.      Left foot: Decreased range of motion. Normal capillary refill. Tenderness (anterior) present. Normal pulse.      Comments: Left anterior ankle with severe pain to touch. Associated erythema and 1 plus edema.    Skin:     General: Skin is warm and dry.   Neurological:      General: No focal deficit present.      Mental Status: He is alert and oriented to person, place, and time.   Psychiatric:         Mood and Affect: Mood normal.         Behavior: Behavior normal.         Thought Content: Thought content normal.         Plan     His ER records were reviewed.     Diagnoses and all orders for this visit:    1. Acute left ankle pain (Primary)  -     Uric acid  -     Comprehensive metabolic panel  -     US venous doppler lower extremity left (duplex); Future    2. Edema of left lower extremity  -     Cancel: US venous doppler lower extremity left  (duplex); Future  -     US venous doppler lower extremity left (duplex); Future    Eval for DVT and gout. Treat with Heat, NSAIDs, and elevation    Will call with results as soon as completed.

## 2021-08-26 DIAGNOSIS — M10.072 ACUTE IDIOPATHIC GOUT OF LEFT ANKLE: Primary | ICD-10-CM

## 2021-08-26 LAB
ALBUMIN SERPL-MCNC: 4.4 G/DL (ref 3.5–5.2)
ALBUMIN/GLOB SERPL: 1.9 G/DL
ALP SERPL-CCNC: 117 U/L (ref 39–117)
ALT SERPL-CCNC: 15 U/L (ref 1–41)
AST SERPL-CCNC: 15 U/L (ref 1–40)
BILIRUB SERPL-MCNC: 0.9 MG/DL (ref 0–1.2)
BUN SERPL-MCNC: 17 MG/DL (ref 6–20)
BUN/CREAT SERPL: 10.4 (ref 7–25)
CALCIUM SERPL-MCNC: 9.8 MG/DL (ref 8.6–10.5)
CHLORIDE SERPL-SCNC: 105 MMOL/L (ref 98–107)
CO2 SERPL-SCNC: 27.1 MMOL/L (ref 22–29)
CREAT SERPL-MCNC: 1.64 MG/DL (ref 0.76–1.27)
GLOBULIN SER CALC-MCNC: 2.3 GM/DL
GLUCOSE SERPL-MCNC: 73 MG/DL (ref 65–99)
POTASSIUM SERPL-SCNC: 4.4 MMOL/L (ref 3.5–5.2)
PROT SERPL-MCNC: 6.7 G/DL (ref 6–8.5)
SODIUM SERPL-SCNC: 143 MMOL/L (ref 136–145)
URATE SERPL-MCNC: 8 MG/DL (ref 3.4–7)

## 2021-08-26 RX ORDER — COLCHICINE 0.6 MG/1
TABLET ORAL
Qty: 3 TABLET | Refills: 0 | Status: SHIPPED | OUTPATIENT
Start: 2021-08-26 | End: 2021-09-23

## 2021-09-09 ENCOUNTER — TELEPHONE (OUTPATIENT)
Dept: INTERNAL MEDICINE | Facility: CLINIC | Age: 46
End: 2021-09-09

## 2021-09-13 NOTE — TELEPHONE ENCOUNTER
Provider: MARGARITA KNIGHT  Caller: BRYAN NICKERSON  Relationship to Patient: PATIENT  Phone Number: [phone]  Reason for Call: HUB READ MESSAGE TO THE PATIENT.  HE STATED HE WAS ABLE TO GET THE MEDICATION AT [facility] WITH A DISCOUNT CARD AND HAS ALREADY TAKEN IT.

## 2021-09-23 ENCOUNTER — OFFICE VISIT (OUTPATIENT)
Dept: INTERNAL MEDICINE | Facility: CLINIC | Age: 46
End: 2021-09-23

## 2021-09-23 VITALS
RESPIRATION RATE: 16 BRPM | SYSTOLIC BLOOD PRESSURE: 132 MMHG | HEIGHT: 72 IN | OXYGEN SATURATION: 99 % | TEMPERATURE: 98.2 F | BODY MASS INDEX: 29.91 KG/M2 | WEIGHT: 220.8 LBS | HEART RATE: 81 BPM | DIASTOLIC BLOOD PRESSURE: 82 MMHG

## 2021-09-23 DIAGNOSIS — M10.072 IDIOPATHIC GOUT OF LEFT ANKLE, UNSPECIFIED CHRONICITY: Primary | ICD-10-CM

## 2021-09-23 PROCEDURE — 99213 OFFICE O/P EST LOW 20 MIN: CPT | Performed by: NURSE PRACTITIONER

## 2021-09-23 NOTE — PROGRESS NOTES
"Subjective   Gonzalez Denton is a 45 y.o. male presenting today for follow up of   Chief Complaint   Patient presents with   • gout     follow up X 4 weeks        History of Present Illness     Patient Active Problem List   Diagnosis   • DDD (degenerative disc disease), cervical   • Cervical radiculopathy   • Gastroesophageal reflux disease without esophagitis   • Pure hypercholesterolemia       Current Outpatient Medications on File Prior to Visit   Medication Sig   • methocarbamol (Robaxin) 500 MG tablet Take 1 tablet by mouth 4 (Four) Times a Day.   • OMEPRAZOLE PO Take  by mouth As Needed.   • [DISCONTINUED] colchicine 0.6 MG tablet Take 2 tablets  by mouth now and repeat 1 tablet by mouth in 1 hr   • [DISCONTINUED] HYDROcodone-acetaminophen (NORCO) 5-325 MG per tablet Take 1 tablet by mouth Every 6 (Six) Hours As Needed.     No current facility-administered medications on file prior to visit.      Mr. Denton had a recent new dx of Gout per my partner NP Abi. His pain and swelling have improved w/ colchicine.    The following portions of the patient's history were reviewed and updated as appropriate: allergies, current medications, past family history, past medical history, past social history, past surgical history and problem list.        Objective   Vitals:    09/23/21 1534   BP: 132/82   Pulse: 81   Resp: 16   Temp: 98.2 °F (36.8 °C)   TempSrc: Temporal   SpO2: 99%   Weight: 100 kg (220 lb 12.8 oz)   Height: 182.9 cm (72.01\")       BP Readings from Last 3 Encounters:   09/23/21 132/82   08/25/21 132/80   08/22/21 140/91        Wt Readings from Last 3 Encounters:   09/23/21 100 kg (220 lb 12.8 oz)   08/25/21 100 kg (221 lb 6.4 oz)   08/22/21 99.3 kg (219 lb)        Body mass index is 29.94 kg/m².  Nursing notes and vitals reviewed.    Physical Exam  Constitutional:       General: He is not in acute distress.  Pulmonary:      Effort: Pulmonary effort is normal. No respiratory distress.   Neurological:      Mental " Status: He is alert and oriented to person, place, and time.   Psychiatric:         Speech: Speech normal.         Thought Content: Thought content normal.         No results found for this or any previous visit (from the past 672 hour(s)).      Assessment/Plan   Diagnoses and all orders for this visit:    1. Idiopathic gout of left ankle, unspecified chronicity (Primary)  Comments:  - discussed intitation of Allopurinol or Uloric pending today's lab results  Orders:  -     Comprehensive Metabolic Panel  -     Uric Acid      Counseled regarding TLCs.         Medications, including side effects, were discussed with the patient. Patient verbalized understanding.  The plan of care was discussed. All questions were answered. Patient verbalized understanding.

## 2021-09-23 NOTE — PATIENT INSTRUCTIONS
Gout    Gout is painful swelling of your joints. Gout is a type of arthritis. It is caused by having too much uric acid in your body. Uric acid is a chemical that is made when your body breaks down substances called purines. If your body has too much uric acid, sharp crystals can form and build up in your joints. This causes pain and swelling.  Gout attacks can happen quickly and be very painful (acute gout). Over time, the attacks can affect more joints and happen more often (chronic gout).  What are the causes?  · Too much uric acid in your blood. This can happen because:  ? Your kidneys do not remove enough uric acid from your blood.  ? Your body makes too much uric acid.  ? You eat too many foods that are high in purines. These foods include organ meats, some seafood, and beer.  · Trauma or stress.  What increases the risk?  · Having a family history of gout.  · Being male and middle-aged.  · Being female and having gone through menopause.  · Being very overweight (obese).  · Drinking alcohol, especially beer.  · Not having enough water in the body (being dehydrated).  · Losing weight too quickly.  · Having an organ transplant.  · Having lead poisoning.  · Taking certain medicines.  · Having kidney disease.  · Having a skin condition called psoriasis.  What are the signs or symptoms?  An attack of acute gout usually happens in just one joint. The most common place is the big toe. Attacks often start at night. Other joints that may be affected include joints of the feet, ankle, knee, fingers, wrist, or elbow. Symptoms of an attack may include:  · Very bad pain.  · Warmth.  · Swelling.  · Stiffness.  · Shiny, red, or purple skin.  · Tenderness. The affected joint may be very painful to touch.  · Chills and fever.  Chronic gout may cause symptoms more often. More joints may be involved. You may also have white or yellow lumps (tophi) on your hands or feet or in other areas near your joints.  How is this  treated?  · Treatment for this condition has two phases: treating an acute attack and preventing future attacks.  · Acute gout treatment may include:  ? NSAIDs.  ? Steroids. These are taken by mouth or injected into a joint.  ? Colchicine. This medicine relieves pain and swelling. It can be given by mouth or through an IV tube.  · Preventive treatment may include:  ? Taking small doses of NSAIDs or colchicine daily.  ? Using a medicine that reduces uric acid levels in your blood.  ? Making changes to your diet. You may need to see a food expert (dietitian) about what to eat and drink to prevent gout.  Follow these instructions at home:  During a gout attack    · If told, put ice on the painful area:  ? Put ice in a plastic bag.  ? Place a towel between your skin and the bag.  ? Leave the ice on for 20 minutes, 2-3 times a day.  · Raise (elevate) the painful joint above the level of your heart as often as you can.  · Rest the joint as much as possible. If the joint is in your leg, you may be given crutches.  · Follow instructions from your doctor about what you cannot eat or drink.    Avoiding future gout attacks  · Eat a low-purine diet. Avoid foods and drinks such as:  ? Liver.  ? Kidney.  ? Anchovies.  ? Asparagus.  ? Herring.  ? Mushrooms.  ? Mussels.  ? Beer.  · Stay at a healthy weight. If you want to lose weight, talk with your doctor. Do not lose weight too fast.  · Start or continue an exercise plan as told by your doctor.  Eating and drinking  · Drink enough fluids to keep your pee (urine) pale yellow.  · If you drink alcohol:  ? Limit how much you use to:  § 0-1 drink a day for women.  § 0-2 drinks a day for men.  ? Be aware of how much alcohol is in your drink. In the [place] one drink equals one 12 oz bottle of beer (355 mL), one 5 oz glass of wine (148 mL), or one 1½ oz glass of hard liquor (44 mL).  General instructions  · Take over-the-counter and prescription medicines only as told by your  doctor.  · Do not drive or use heavy machinery while taking prescription pain medicine.  · Return to your normal activities as told by your doctor. Ask your doctor what activities are safe for you.  · Keep all follow-up visits as told by your doctor. This is important.  Contact a doctor if:  · You have another gout attack.  · You still have symptoms of a gout attack after 10 days of treatment.  · You have problems (side effects) because of your medicines.  · You have chills or a fever.  · You have burning pain when you pee (urinate).  · You have pain in your lower back or belly.  Get help right away if:  · You have very bad pain.  · Your pain cannot be controlled.  · You cannot pee.  Summary  · Gout is painful swelling of the joints.  · The most common site of pain is the big toe, but it can affect other joints.  · Medicines and avoiding some foods can help to prevent and treat gout attacks.  This information is not intended to replace advice given to you by your health care provider. Make sure you discuss any questions you have with your health care provider.  Document Revised: 07/10/2019 Document Reviewed: 07/10/2019  Elsevier Patient Education © 2021 [employer]

## 2021-09-24 LAB
ALBUMIN SERPL-MCNC: 5 G/DL (ref 3.5–5.2)
ALBUMIN/GLOB SERPL: 2.6 G/DL
ALP SERPL-CCNC: 107 U/L (ref 39–117)
ALT SERPL-CCNC: 31 U/L (ref 1–41)
AST SERPL-CCNC: 22 U/L (ref 1–40)
BILIRUB SERPL-MCNC: 0.2 MG/DL (ref 0–1.2)
BUN SERPL-MCNC: 18 MG/DL (ref 6–20)
BUN/CREAT SERPL: 18.9 (ref 7–25)
CALCIUM SERPL-MCNC: 9.8 MG/DL (ref 8.6–10.5)
CHLORIDE SERPL-SCNC: 104 MMOL/L (ref 98–107)
CO2 SERPL-SCNC: 26 MMOL/L (ref 22–29)
CREAT SERPL-MCNC: 0.95 MG/DL (ref 0.76–1.27)
GLOBULIN SER CALC-MCNC: 1.9 GM/DL
GLUCOSE SERPL-MCNC: 86 MG/DL (ref 65–99)
POTASSIUM SERPL-SCNC: 3.8 MMOL/L (ref 3.5–5.2)
PROT SERPL-MCNC: 6.9 G/DL (ref 6–8.5)
SODIUM SERPL-SCNC: 142 MMOL/L (ref 136–145)
URATE SERPL-MCNC: 7.3 MG/DL (ref 3.4–7)

## 2022-05-27 ENCOUNTER — TELEPHONE (OUTPATIENT)
Dept: INTERNAL MEDICINE | Facility: CLINIC | Age: 47
End: 2022-05-27

## 2022-05-27 DIAGNOSIS — Z00.00 ROUTINE HEALTH MAINTENANCE: Primary | ICD-10-CM

## 2022-05-27 DIAGNOSIS — M10.072 ACUTE IDIOPATHIC GOUT OF LEFT ANKLE: ICD-10-CM

## 2022-05-27 NOTE — TELEPHONE ENCOUNTER
Patient is coming in for labs on 06/17/2022 and we are in need of lab orders please.    Thank you!

## 2022-07-08 ENCOUNTER — OFFICE VISIT (OUTPATIENT)
Dept: INTERNAL MEDICINE | Facility: CLINIC | Age: 47
End: 2022-07-08

## 2022-07-08 VITALS
WEIGHT: 221.2 LBS | DIASTOLIC BLOOD PRESSURE: 82 MMHG | HEIGHT: 72 IN | TEMPERATURE: 97.8 F | BODY MASS INDEX: 29.96 KG/M2 | HEART RATE: 83 BPM | SYSTOLIC BLOOD PRESSURE: 120 MMHG | OXYGEN SATURATION: 99 %

## 2022-07-08 DIAGNOSIS — M54.6 THORACIC SPINE PAIN: ICD-10-CM

## 2022-07-08 DIAGNOSIS — M50.30 DDD (DEGENERATIVE DISC DISEASE), CERVICAL: ICD-10-CM

## 2022-07-08 DIAGNOSIS — Z00.00 ENCOUNTER FOR WELLNESS EXAMINATION IN ADULT: Primary | ICD-10-CM

## 2022-07-08 PROCEDURE — 99396 PREV VISIT EST AGE 40-64: CPT | Performed by: NURSE PRACTITIONER

## 2022-07-08 NOTE — PROGRESS NOTES
"DAVID Roth is a 46 y.o. male presenting for Annual Exam (physical)    His current/chronic health conditions include:  Patient Active Problem List   Diagnosis   • DDD (degenerative disc disease), cervical   • Cervical radiculopathy   • Gastroesophageal reflux disease without esophagitis   • Pure hypercholesterolemia       Outpatient Medications Marked as Taking for the 7/8/22 encounter (Office Visit) with Emely Naidu APRN   Medication Sig Dispense Refill   • methocarbamol (Robaxin) 500 MG tablet Take 1 tablet by mouth 4 (Four) Times a Day. 60 tablet 1   • OMEPRAZOLE PO Take  by mouth As Needed.            Screenings:  PSA: n/a  Colon Cancer: negative cologuard 11/2021  Tob use: n/a      Complaints today include:  He notes worsening cervical and thoracic back pain. This is nightly now. Less relief w/ Robaxin.    The patient's allergies, current medications, problem list, past medical history, past family history, and past social history were reviewed and updated as appropriate.        OBJECTIVE    Vitals:    07/08/22 0945 07/08/22 1014   BP: 140/90 120/82   Pulse: 83    Temp: 97.8 °F (36.6 °C)    SpO2: 99%    Weight: 100 kg (221 lb 3.2 oz)    Height: 182.9 cm (72.01\")        BP Readings from Last 3 Encounters:   07/08/22 120/82   12/04/21 150/81   09/23/21 132/82       Wt Readings from Last 3 Encounters:   07/08/22 100 kg (221 lb 3.2 oz)   12/04/21 99.8 kg (220 lb)   09/23/21 100 kg (220 lb 12.8 oz)       Body mass index is 29.99 kg/m².  Nursing notes and vital signs reviewed.    Physical Exam  Constitutional:       General: He is not in acute distress.     Appearance: Normal appearance. He is well-developed.   HENT:      Head: Normocephalic.      Right Ear: Hearing, tympanic membrane, ear canal and external ear normal.      Left Ear: Hearing, tympanic membrane, ear canal and external ear normal.      Nose: Nose normal. No mucosal edema or rhinorrhea.      Mouth/Throat:      Mouth: Mucous membranes " are moist.      Pharynx: Oropharynx is clear. Uvula midline.   Eyes:      General: Lids are normal.      Extraocular Movements: Extraocular movements intact.      Conjunctiva/sclera: Conjunctivae normal.      Pupils: Pupils are equal, round, and reactive to light.   Neck:      Thyroid: No thyroid mass or thyromegaly.   Cardiovascular:      Rate and Rhythm: Regular rhythm.      Pulses: Normal pulses.      Heart sounds: S1 normal and S2 normal. No murmur heard.    No friction rub. No gallop.   Pulmonary:      Effort: Pulmonary effort is normal.      Breath sounds: Normal breath sounds. No wheezing, rhonchi or rales.   Abdominal:      General: Bowel sounds are normal.      Palpations: Abdomen is soft.      Tenderness: There is no abdominal tenderness. There is no guarding.      Hernia: No hernia is present.   Musculoskeletal:         General: No deformity. Normal range of motion.      Cervical back: Normal range of motion and neck supple.   Lymphadenopathy:      Cervical: No cervical adenopathy.   Skin:     General: Skin is warm and dry.      Findings: No lesion or rash.   Neurological:      General: No focal deficit present.      Mental Status: He is alert and oriented to person, place, and time.      Cranial Nerves: No cranial nerve deficit.      Sensory: No sensory deficit.      Motor: Motor function is intact.      Coordination: Coordination is intact.      Gait: Gait normal.      Deep Tendon Reflexes: Reflexes are normal and symmetric.   Psychiatric:         Attention and Perception: He is attentive.         Mood and Affect: Mood and affect normal.         Speech: Speech normal.         Behavior: Behavior normal.         Thought Content: Thought content normal.           Recent Results (from the past 672 hour(s))   CBC (No Diff)    Collection Time: 06/17/22  8:06 AM    Specimen: Blood   Result Value Ref Range    WBC 6.6 3.4 - 10.8 x10E3/uL    RBC 4.92 4.14 - 5.80 x10E6/uL    Hemoglobin 13.4 13.0 - 17.7 g/dL     Hematocrit 42.0 37.5 - 51.0 %    MCV 85 79 - 97 fL    MCH 27.2 26.6 - 33.0 pg    MCHC 31.9 31.5 - 35.7 g/dL    RDW 13.7 11.6 - 15.4 %    Platelets 210 150 - 450 x10E3/uL   Comprehensive Metabolic Panel    Collection Time: 06/17/22  8:06 AM    Specimen: Blood   Result Value Ref Range    Glucose 84 65 - 99 mg/dL    BUN 17 6 - 24 mg/dL    Creatinine 1.10 0.76 - 1.27 mg/dL    EGFR Result 84 >59 mL/min/1.73    BUN/Creatinine Ratio 15 9 - 20    Sodium 143 134 - 144 mmol/L    Potassium 4.1 3.5 - 5.2 mmol/L    Chloride 104 96 - 106 mmol/L    Total CO2 24 20 - 29 mmol/L    Calcium 9.4 8.7 - 10.2 mg/dL    Total Protein 6.6 6.0 - 8.5 g/dL    Albumin 4.4 4.0 - 5.0 g/dL    Globulin 2.2 1.5 - 4.5 g/dL    A/G Ratio 2.0 1.2 - 2.2    Total Bilirubin 0.6 0.0 - 1.2 mg/dL    Alkaline Phosphatase 105 44 - 121 IU/L    AST (SGOT) 17 0 - 40 IU/L    ALT (SGPT) 22 0 - 44 IU/L   Lipid Panel With / Chol / HDL Ratio    Collection Time: 06/17/22  8:06 AM    Specimen: Blood   Result Value Ref Range    Total Cholesterol 198 100 - 199 mg/dL    Triglycerides 84 0 - 149 mg/dL    HDL Cholesterol 49 >39 mg/dL    VLDL Cholesterol Shin 15 5 - 40 mg/dL    LDL Chol Calc (NIH) 134 (H) 0 - 99 mg/dL    Chol/HDL Ratio 4.0 0.0 - 5.0 ratio   TSH    Collection Time: 06/17/22  8:06 AM    Specimen: Blood   Result Value Ref Range    TSH 1.690 0.450 - 4.500 uIU/mL   Uric Acid    Collection Time: 06/17/22  8:06 AM    Specimen: Blood   Result Value Ref Range    Uric Acid 8.3 3.8 - 8.4 mg/dL         ASSESSMENT AND PLAN    Diagnoses and all orders for this visit:    1. Encounter for wellness examination in adult (Primary)    2. DDD (degenerative disc disease), cervical  -     Ambulatory Referral to Physical Therapy Evaluate and treat    3. Thoracic spine pain  -     Ambulatory Referral to Physical Therapy Evaluate and treat          Preventative counseling completed including relevant screenings, appropriate vaccinations, healthy nutrition, and appropriate physical  activity.  BMI is >= 25 and <30. (Overweight) The following options were offered after discussion;: exercise counseling/recommendations and nutrition counseling/recommendations          Medications, including side effects, were discussed with the patient. Patient verbalized understanding.  The plan of care was discussed. All questions were answered. Patient verbalized understanding.        Return in about 1 year (around 7/8/2023) for fasting labs one week prior to, Annual physical., or sooner if needed.

## 2022-07-22 ENCOUNTER — HOSPITAL ENCOUNTER (OUTPATIENT)
Dept: PHYSICAL THERAPY | Facility: HOSPITAL | Age: 47
Setting detail: THERAPIES SERIES
Discharge: HOME | End: 2022-07-22

## 2022-07-22 DIAGNOSIS — M50.30 DDD (DEGENERATIVE DISC DISEASE), CERVICAL: Primary | ICD-10-CM

## 2022-07-22 DIAGNOSIS — M54.6 THORACIC SPINE PAIN: ICD-10-CM

## 2022-07-22 PROCEDURE — 97161 PT EVAL LOW COMPLEX 20 MIN: CPT | Performed by: PHYSICAL THERAPIST

## 2022-07-22 NOTE — THERAPY EVALUATION
Outpatient Physical Therapy Ortho Initial Evaluation  GABRIELA Bullard     Patient Name: Gonzalez Denton  : 1975  MRN: [id number]  Today's Date: 2022      Visit Date: 2022    Patient Active Problem List   Diagnosis   • DDD (degenerative disc disease), cervical   • Cervical radiculopathy   • Gastroesophageal reflux disease without esophagitis   • Pure hypercholesterolemia        Past Medical History:   Diagnosis Date   • GERD (gastroesophageal reflux disease)    • History of kidney stones 2018    5mm stone that caused hydronephrosis requiring lithotripsy and uretal stent placement  and multiple ones since that time requiring lithotripsy. He sees Dr. Forbes once per year    • Kidney stone         Past Surgical History:   Procedure Laterality Date   • ADENOIDECTOMY     • CYSTOSCOPY W/ LASER LITHOTRIPSY      Sees Dr. Forbes yearly    • TONSILLECTOMY     • VASECTOMY         Visit Dx:     ICD-10-CM ICD-9-CM   1. DDD (degenerative disc disease), cervical  M50.30 722.4   2. Thoracic spine pain  M54.6 724.1          Patient History     Row Name 22 1000 22 0726          History    Chief Complaint Numbness;Pain;Tightness  -GC Numbness;Pain;Tightness  -GC (r) patient (t)     Type of Pain Back pain  -GC Back pain  -GC (r) patient (t)     Date Current Problem(s) Began 21  -GC 21  -GC (r) patient (t)     Brief Description of Current Complaint Pt reports chronic midback pain that has begun a little worse over the past year. His pain is waking him at nights and he does have some pain if he has his arms overhead. He was seen by PING Caruso who palced him on an anti-inflammatory medication and referred him to therapy.  -GC Having chronic pain in middle of back.  Frequently wakes me up and affects sleep.  Get occasionally numbness in arms and legs.  Get nerve sensation in hips and legs occasionally.  -GC (r) patient (t)     Patient/Caregiver Goals Relieve pain;Know what to do to  help the symptoms  -GC Relieve pain;Know what to do to help the symptoms  -GC (r) patient (t)     Patient's Rating of General Health Good  -GC --     Hand Dominance right-handed  -GC right-handed  -GC (r) patient (t)     Occupation/sports/leisure activities Hiking; occasional volleyball  -GC Hiking; occasional volleyball  -GC (r) patient (t)     Patient seeing anyone else for problem(s)? No  -GC No  -GC (r) patient (t)     How has patient tried to help current problem? pt has tried chiropractic care and has had an epidural in the past, he also has been in therapy previous to this episode  -GC --            Pain     Pain Location Back  thoracic area  -GC --     Pain at Present 1  -GC --     Pain at Best 1  -GC --     Pain at Worst 5  -GC --     Pain Frequency Constant/continuous  -GC --     Pain Description Aching;Discomfort;Sore  -GC --     What Performance Factors Make the Current Problem(s) WORSE? Pt c/o pain primarily at night and occasionally when he has his arms overhead  -GC --     Is your sleep disturbed? Yes  -GC --     Difficulties at work? Pt occasionally has pain when sitting at his desk/computer  -GC --            Fall Risk Assessment    Any falls in the past year: No  -GC No  -GC (r) patient (t)            Services    Prior Rehab/Home Health Experiences Yes  -GC Yes  -GC (r) patient (t)     Are you currently receiving Home Health services No  -GC No  -GC (r) patient (t)     Do you plan to receive Home Health services in the near future No  -GC No  -GC (r) patient (t)            Daily Activities    Primary Language English  -GC English  -GC (r) patient (t)     How does patient learn best? Reading  -GC Reading  -GC (r) patient (t)     Teaching needs identified Home Exercise Program;Management of Condition  -GC --     Patient is concerned about/has problems with Reaching over head;Performing home management (household chores, shopping, care of dependents)  -GC --     Does patient have problems with the  following? None  -GC --     Barriers to learning None  -GC --     Pt Participated in POC and Goals Yes  -GC --            Safety    Are you being hurt, hit, or frightened by anyone at home or in your life? No  -GC No  -GC (r) patient (t)     Are you being neglected by a caregiver No  -GC No  -GC (r) patient (t)     Have you had any of the following issues with N/A  -GC N/A  -GC (r) patient (t)           User Key  (r) = Recorded By, (t) = Taken By, (c) = Cosigned By    Initials Name Provider Type    GC Davey Rosas, PT Physical Therapist    patient Gonzalez Denton --                 PT Ortho     Row Name 07/22/22 1000       Posture/Observations    Posture/Observations Comments Pt has mild rounding of shoulders, he has mild atrophy of left periscapular musculature  -GC       Cervical Palpation    Middle Traps Bilateral:;Tender;Guarded/taut  -GC    Rhomboids Bilateral:;Tender;Guarded/taut  -GC       Thoracic Accessory Motions    PA glide- T1 WNL  -GC    PA glide- T2 WNL  -GC    PA glide- T3 WNL  -GC    PA glide- T4 Hypomobile  -GC    PA glide- T5 Hypomobile  -GC    PA glide- T6 Hypomobile  -GC    PA glide- T7 WNL  -GC    PA glide- T8 WNL  -GC    PA glide- T9 WNL  -GC    PA glide- T10 WNL  -GC       General ROM    GENERAL ROM COMMENTS bilateral shoulder AROM is WFL  -GC       MMT Right Upper Ext    Rt Shoulder Flexion MMT, Gross Movement (5/5) normal  -GC    Rt Shoulder Extension MMT, Gross Movement (5/5) normal  -GC    Rt Shoulder ABduction MMT, Gross Movement (5/5) normal  -GC    Rt Shoulder ADduction MMT, Gross Movement (5/5) normal  -GC    Rt Shoulder Internal Rotation MMT, Gross Movement (5/5) normal  -GC    Rt Shoulder External Rotation MMT, Gross Movement (5/5) normal  -GC    Rt Scapular Elevation MMT, Gross Movement (5/5) normal  -GC    Rt Scapular ADduction MMT, Gross Movement (4/5) good  -GC    Rt Scapular ADduction & Depression MMT, Gross Movement (4/5) good  -GC    Rt Scapular ADD & Medial Rotation MMT, Gross  Movement (4/5) good  -GC    Rt Scapular ABD & Lateral Rotation MMT, Gross Movement (4/5) good  -GC       MMT Left Upper Ext    Lt Shoulder Flexion MMT, Gross Movement (5/5) normal  -GC    Lt Shoulder Extension MMT, Gross Movement (5/5) normal  -GC    Lt Shoulder ABduction MMT, Gross Movement (5/5) normal  -GC    Lt Shoulder ADduction MMT, Gross Movement (5/5) normal  -GC    Lt Shoulder Internal Rotation MMT, Gross Movement (5/5) normal  -GC    Lt Shoulder External Rotation MMT, Gross Movement (5/5) normal  -GC    Lt Scapular Elevation MMT, Gross Movement (5/5) normal  -GC    Lt Scapular ADduction MMT, Gross Movement (4/5) good  -GC    Lt Scapular ADduction & Depression MMT, Gross Movement (4/5) good  -GC    Lt Scapular ADD & Medial Rotation MMT, Gross Movement (4/5) good  -GC    Lt Scapular ABD & Lateral Rotation MMT, Gross Movement (4/5) good  -GC       Sensation    Light Touch No apparent deficits  -GC       Upper Extremity Flexibility    Upper Trapezius Bilateral:;WNL  -GC    Levator Scapula Bilateral:;WNL  -GC          User Key  (r) = Recorded By, (t) = Taken By, (c) = Cosigned By    Initials Name Provider Type    GC Davey Rosas, PT Physical Therapist                            Therapy Education  Given: HEP, Symptoms/condition management, Pain management, Posture/body mechanics  Program: New  How Provided: Verbal, Demonstration, Written  Provided to: Patient  Level of Understanding: Teach back education performed, Verbalized, Demonstrated      PT OP Goals     Row Name 07/22/22 1000          PT Short Term Goals    STG Date to Achieve 08/05/22  -     STG 1 Decrease thoracic pain to 2-3/10 wiht activity and at night.  -GC     STG 2 Increase thoracic spinal mobility to WNL with testing.  -GC     STG 3 Increase scapular stabilization strength to at least 4+/5 all planes with testing.  -     STG 4 Pt will be independent with his HEP issued by this therapist.  -GC            Long Term Goals    LTG Date to  Achieve 08/19/22  -     LTG 1 Decrease thoracic pain to 0-1/10 wiht activity and at night.  -     LTG 2 Increase scapular stabilization strength to 5/5 all planes with testing.  -     LTG 3 Pt will be independent with all ADLs and be able to sleep without pain.  -            Time Calculation    PT Goal Re-Cert Due Date 08/19/22  -           User Key  (r) = Recorded By, (t) = Taken By, (c) = Cosigned By    Initials Name Provider Type     Davey Rosas, PT Physical Therapist                 PT Assessment/Plan     Row Name 07/22/22 1000          PT Assessment    Functional Limitations Limitation in home management;Limitations in functional capacity and performance;Performance in leisure activities;Performance in work activities  -     Impairments Range of motion;Pain;Muscle strength;Joint mobility  -     Assessment Comments Pt presents with history of mid-thoracic pain that he rates up to 5/10 primarily at night and occasionally while at his desk/computer and when reaching overhead. He has decreased spinal mobility T4-6 with decreased scapular strength bilaterally. Feel he would benfit from spinal mobilizations and scapular strengthening exercises to restore proper shoulder mechanics and spinal ROM.  -     Please refer to paper survey for additional self-reported information Yes  -GC     Rehab Potential Good  -     Patient/caregiver participated in establishment of treatment plan and goals Yes  -     Patient would benefit from skilled therapy intervention Yes  -GC            PT Plan    PT Frequency 1x/week;2x/week  -     Predicted Duration of Therapy Intervention (PT) 4 weeks  -     Planned CPT's? PT EVAL LOW COMPLEXITY: 22219;PT THER PROC EA 15 MIN: 03814;PT MANUAL THERAPY EA 15 MIN: 06479;PT ULTRASOUND EA 15 MIN: 00407;PT ELECTRICAL STIM UNATTEND: ;PT HOT OR COLD PACK TREAT MCARE  -     PT Plan Comments Pt is to continue his HEP 2x daily.  -           User Key  (r) = Recorded By,  (t) = Taken By, (c) = Cosigned By    Initials Name Provider Type     Davey Rosas, PT Physical Therapist                 Modalities     Row Name 07/22/22 1000             Ultrasound 75745    Location thoracic spine T4-6 area with pt prone  -GC      Duty Cycle 100  -GC      Frequency 1.0 MHz  -GC      Intensity - Wts/cm 1.5  -GC      06341 - PT Ultrasound Minutes 8  -GC            User Key  (r) = Recorded By, (t) = Taken By, (c) = Cosigned By    Initials Name Provider Type     Davey Rosas, PT Physical Therapist               OP Exercises     Row Name 07/22/22 1000             Exercise 1    Exercise Name 1 AP mobilizations T4-6  -GC      Cueing 1 Verbal;Tactile  -GC      Time 1 3 min  -GC              Exercise 2    Exercise Name 2 Golf ball roll mobilzation in supine midthoracic spine  -GC      Cueing 2 Verbal;Tactile;Demo  -GC      Additional Comments pt instructed to do this 2-3 min  -GC              Exercise 3    Exercise Name 3 Bilateral serratus punch in supine  -GC      Cueing 3 Verbal;Tactile  -GC      Reps 3 25  -GC      Time 3 3#  -GC              Exercise 4    Exercise Name 4 Bilateral shoulder EXT vs theraband  -GC      Cueing 4 Verbal;Tactile;Demo  -GC      Reps 4 25  -GC      Time 4 black  -GC              Exercise 5    Exercise Name 5 Bilateral shoulder rows vs theraband  -GC      Cueing 5 Verbal;Tactile;Demo  -GC      Reps 5 25  -GC      Time 5 black  -GC            User Key  (r) = Recorded By, (t) = Taken By, (c) = Cosigned By    Initials Name Provider Type     Davey Rosas, PT Physical Therapist                                        Time Calculation:     Start Time: 1000  Stop Time: 1057  Time Calculation (min): 57 min  Timed Charges  40378 - PT Ultrasound Minutes: 8  Total Minutes  Timed Charges Total Minutes: 8   Total Minutes: 8     Therapy Charges for Today     Code Description Service Date Service Provider Modifiers Qty    18629592871  PT EVAL LOW COMPLEXITY 3 7/22/2022 Ralph  Davey, PT GP 1                    Davey Rosas, PT  7/22/2022

## 2022-07-29 ENCOUNTER — HOSPITAL ENCOUNTER (OUTPATIENT)
Dept: PHYSICAL THERAPY | Facility: HOSPITAL | Age: 47
Setting detail: THERAPIES SERIES
Discharge: HOME | End: 2022-07-29

## 2022-07-29 DIAGNOSIS — M54.6 THORACIC SPINE PAIN: ICD-10-CM

## 2022-07-29 DIAGNOSIS — M50.30 DDD (DEGENERATIVE DISC DISEASE), CERVICAL: Primary | ICD-10-CM

## 2022-07-29 PROCEDURE — 97110 THERAPEUTIC EXERCISES: CPT | Performed by: PHYSICAL THERAPIST

## 2022-07-29 PROCEDURE — 97035 APP MDLTY 1+ULTRASOUND EA 15: CPT | Performed by: PHYSICAL THERAPIST

## 2022-07-29 NOTE — THERAPY TREATMENT NOTE
Outpatient Physical Therapy Ortho Treatment Note  GABRIELA Bullard     Patient Name: Gonzalez Denton  : 1975  MRN: [id number]  Today's Date: 2022      Visit Date: 2022    Visit Dx:    ICD-10-CM ICD-9-CM   1. DDD (degenerative disc disease), cervical  M50.30 722.4   2. Thoracic spine pain  M54.6 724.1       Patient Active Problem List   Diagnosis   • DDD (degenerative disc disease), cervical   • Cervical radiculopathy   • Gastroesophageal reflux disease without esophagitis   • Pure hypercholesterolemia        Past Medical History:   Diagnosis Date   • GERD (gastroesophageal reflux disease)    • History of kidney stones 2018    5mm stone that caused hydronephrosis requiring lithotripsy and uretal stent placement  and multiple ones since that time requiring lithotripsy. He sees Dr. Forbes once per year    • Kidney stone         Past Surgical History:   Procedure Laterality Date   • ADENOIDECTOMY     • CYSTOSCOPY W/ LASER LITHOTRIPSY      Sees Dr. Forbes yearly    • TONSILLECTOMY     • VASECTOMY                          PT Assessment/Plan     Row Name 22 0875          PT Assessment    Assessment Comments Pt is oding well with decreased c/o pain and good tolerance to his exercise progression.  -GC            PT Plan    PT Plan Comments Pt is to continue his HEP daily.  -GC           User Key  (r) = Recorded By, (t) = Taken By, (c) = Cosigned By    Initials Name Provider Type    GC Davey Rosas, PT Physical Therapist                 Modalities     Row Name 22 1225             Ultrasound 54208    Location thoracic spine T4-6 area with pt prone  -GC      Duty Cycle 100  -GC      Frequency 1.0 MHz  -GC      Intensity - Wts/cm 1.5  -GC      77296 - PT Ultrasound Minutes 8  -GC            User Key  (r) = Recorded By, (t) = Taken By, (c) = Cosigned By    Initials Name Provider Type    Davey Starr, PT Physical Therapist               OP Exercises     Row Name 22 6602              Subjective Comments    Subjective Comments Pt states his back feels a little better.  -GC              Exercise 1    Exercise Name 1 AP mobilizations T4-6  -GC      Cueing 1 Verbal;Tactile  -GC      Time 1 3 min  -GC              Exercise 2    Exercise Name 2 Golf ball roll mobilzation in supine midthoracic spine  -GC      Cueing 2 Verbal;Tactile;Demo  -GC              Exercise 3    Exercise Name 3 Bilateral serratus punch in supine  -GC      Cueing 3 Verbal;Tactile  -GC      Reps 3 25  -GC      Time 3 5#  -GC              Exercise 4    Exercise Name 4 Bilateral shoulder EXT vs theraband  -GC      Cueing 4 Verbal;Tactile;Demo  -GC      Reps 4 25  -GC      Time 4 silver  -GC              Exercise 5    Exercise Name 5 Bilateral shoulder rows vs theraband  -GC      Cueing 5 Verbal;Tactile;Demo  -GC      Reps 5 25  -GC      Time 5 silver  -GC            User Key  (r) = Recorded By, (t) = Taken By, (c) = Cosigned By    Initials Name Provider Type     Davey Rosas, PT Physical Therapist                                                Time Calculation:   Start Time: 1225  Stop Time: 1304  Time Calculation (min): 39 min  Timed Charges  20741 - PT Ultrasound Minutes: 8  Total Minutes  Timed Charges Total Minutes: 8   Total Minutes: 8  Therapy Charges for Today     Code Description Service Date Service Provider Modifiers Qty    19615286858 HC PT ULTRASOUND EA 15 MIN 7/29/2022 Davey Rosas, PT GP 1    59487246686 HC PT THER PROC EA 15 MIN 7/29/2022 Davey Rosas, PT GP 1                    Davey Rosas PT  7/29/2022

## 2022-08-12 ENCOUNTER — HOSPITAL ENCOUNTER (OUTPATIENT)
Dept: PHYSICAL THERAPY | Facility: HOSPITAL | Age: 47
Setting detail: THERAPIES SERIES
Discharge: HOME | End: 2022-08-12

## 2022-08-12 DIAGNOSIS — M54.6 THORACIC SPINE PAIN: ICD-10-CM

## 2022-08-12 DIAGNOSIS — M50.30 DDD (DEGENERATIVE DISC DISEASE), CERVICAL: Primary | ICD-10-CM

## 2022-08-12 PROCEDURE — 97035 APP MDLTY 1+ULTRASOUND EA 15: CPT | Performed by: PHYSICAL THERAPIST

## 2022-08-12 PROCEDURE — 97110 THERAPEUTIC EXERCISES: CPT | Performed by: PHYSICAL THERAPIST

## 2022-08-12 NOTE — THERAPY TREATMENT NOTE
Outpatient Physical Therapy Ortho Treatment Note  GABRIELA Bullard     Patient Name: Gonzalez Denton  : 1975  MRN: [id number]  Today's Date: 2022      Visit Date: 2022    Visit Dx:    ICD-10-CM ICD-9-CM   1. DDD (degenerative disc disease), cervical  M50.30 722.4   2. Thoracic spine pain  M54.6 724.1       Patient Active Problem List   Diagnosis   • DDD (degenerative disc disease), cervical   • Cervical radiculopathy   • Gastroesophageal reflux disease without esophagitis   • Pure hypercholesterolemia        Past Medical History:   Diagnosis Date   • GERD (gastroesophageal reflux disease)    • History of kidney stones 2018    5mm stone that caused hydronephrosis requiring lithotripsy and uretal stent placement  and multiple ones since that time requiring lithotripsy. He sees Dr. Forbes once per year    • Kidney stone         Past Surgical History:   Procedure Laterality Date   • ADENOIDECTOMY     • CYSTOSCOPY W/ LASER LITHOTRIPSY      Sees Dr. Forbes yearly    • TONSILLECTOMY     • VASECTOMY                          PT Assessment/Plan     Row Name 22 1210          PT Assessment    Assessment Comments Pt is doing well with decreasing pain and improving function.  -GC            PT Plan    PT Plan Comments Pt is to continue his  -GC           User Key  (r) = Recorded By, (t) = Taken By, (c) = Cosigned By    Initials Name Provider Type    GC Davey Rosas, PT Physical Therapist                 Modalities     Row Name 22 1210             Ultrasound 35410    Location thoracic spine T4-6 area with pt prone  -GC      Duty Cycle 100  -GC      Frequency 1.0 MHz  -GC      Intensity - Wts/cm 1.5  -GC      98357 - PT Ultrasound Minutes 8  -GC            User Key  (r) = Recorded By, (t) = Taken By, (c) = Cosigned By    Initials Name Provider Type    Davey Starr, PT Physical Therapist               OP Exercises     Row Name 22 1210             Subjective Comments    Subjective  Comments Pt states he is feleing better with only intermittent pain on the right side now.  -GC              Exercise 1    Exercise Name 1 AP mobilizations T4-6  -GC      Cueing 1 Verbal;Tactile  -GC      Time 1 3 min  -GC              Exercise 2    Exercise Name 2 Golf ball roll mobilzation in supine midthoracic spine  -GC      Cueing 2 Verbal;Tactile;Demo  -GC              Exercise 3    Exercise Name 3 Bilateral serratus punch in supine  -GC      Cueing 3 Verbal;Tactile  -GC      Reps 3 25  -GC      Time 3 5#  -GC              Exercise 4    Exercise Name 4 Bilateral shoulder EXT vs theraband  -GC      Cueing 4 Verbal;Tactile;Demo  -GC      Reps 4 25  -GC      Time 4 silver  -GC              Exercise 5    Exercise Name 5 Bilateral shoulder rows vs theraband  -GC      Cueing 5 Verbal;Tactile;Demo  -GC      Reps 5 25  -GC      Time 5 silver  -GC              Exercise 6    Exercise Name 6 posterior cuff stretch right  -GC      Cueing 6 Verbal;Tactile;Demo  -GC      Reps 6 10  -GC      Time 6 10 secs  -GC              Exercise 7    Exercise Name 7 lat stretch in sitting with UEs on table  -GC      Cueing 7 Verbal;Tactile;Demo  -GC      Reps 7 10  -GC      Time 7 10 secs  -GC            User Key  (r) = Recorded By, (t) = Taken By, (c) = Cosigned By    Initials Name Provider Type     Davey Rosas, PT Physical Therapist                                                Time Calculation:   Start Time: 1210  Stop Time: 1252  Time Calculation (min): 42 min  Timed Charges  29827 - PT Ultrasound Minutes: 8  Total Minutes  Timed Charges Total Minutes: 8   Total Minutes: 8  Therapy Charges for Today     Code Description Service Date Service Provider Modifiers Qty    51862608757  PT ULTRASOUND EA 15 MIN 8/12/2022 Davey Rosas, PT GP 1    [id number]  PT THER PROC EA 15 MIN 8/12/2022 Davye Rosas, PT GP 1                    Davey Rosas PT  8/12/2022

## 2022-08-19 ENCOUNTER — HOSPITAL ENCOUNTER (OUTPATIENT)
Dept: PHYSICAL THERAPY | Facility: HOSPITAL | Age: 47
Setting detail: THERAPIES SERIES
Discharge: HOME | End: 2022-08-19

## 2022-08-19 DIAGNOSIS — M50.30 DDD (DEGENERATIVE DISC DISEASE), CERVICAL: Primary | ICD-10-CM

## 2022-08-19 DIAGNOSIS — M54.6 THORACIC SPINE PAIN: ICD-10-CM

## 2022-08-19 PROCEDURE — 97035 APP MDLTY 1+ULTRASOUND EA 15: CPT | Performed by: PHYSICAL THERAPIST

## 2022-08-19 PROCEDURE — 97110 THERAPEUTIC EXERCISES: CPT | Performed by: PHYSICAL THERAPIST

## 2022-08-19 NOTE — THERAPY TREATMENT NOTE
Outpatient Physical Therapy Ortho Treatment Note  GABRIELA Bullard     Patient Name: Gonzalez Denton  : 1975  MRN: [id number]  Today's Date: 2022      Visit Date: 2022    Visit Dx:    ICD-10-CM ICD-9-CM   1. DDD (degenerative disc disease), cervical  M50.30 722.4   2. Thoracic spine pain  M54.6 724.1       Patient Active Problem List   Diagnosis   • DDD (degenerative disc disease), cervical   • Cervical radiculopathy   • Gastroesophageal reflux disease without esophagitis   • Pure hypercholesterolemia        Past Medical History:   Diagnosis Date   • GERD (gastroesophageal reflux disease)    • History of kidney stones 2018    5mm stone that caused hydronephrosis requiring lithotripsy and uretal stent placement  and multiple ones since that time requiring lithotripsy. He sees Dr. Forbes once per year    • Kidney stone         Past Surgical History:   Procedure Laterality Date   • ADENOIDECTOMY     • CYSTOSCOPY W/ LASER LITHOTRIPSY      Sees Dr. Forbes yearly    • TONSILLECTOMY     • VASECTOMY                          PT Assessment/Plan     Row Name 22 1100          PT Assessment    Assessment Comments Pt is doing well with no significant c/o pain. Feel he is nearing the end of his need for skilled therapy serivces.  -GC            PT Plan    PT Plan Comments Pt is to continue his HEP daily. He will call in one week to update his progress and determine if additional therapy is needed.  -GC           User Key  (r) = Recorded By, (t) = Taken By, (c) = Cosigned By    Initials Name Provider Type    GC Davey Rosas, PT Physical Therapist                 Modalities     Row Name 22 1100             Ultrasound 44648    Location thoracic spine T4-6 area with pt prone  -GC      Duty Cycle 100  -GC      Frequency 1.0 MHz  -GC      Intensity - Wts/cm 1.5  -GC      61906 - PT Ultrasound Minutes 8  -GC            User Key  (r) = Recorded By, (t) = Taken By, (c) = Cosigned By    Initials Name  Provider Type     Davey Rosas PT Physical Therapist               OP Exercises     Row Name 08/19/22 1100             Subjective Comments    Subjective Comments Pt states his back is feeling better.  -GC              Exercise 1    Exercise Name 1 AP mobilizations T4-6  -GC      Cueing 1 Verbal;Tactile  -GC      Time 1 3 min  -GC              Exercise 2    Exercise Name 2 Golf ball roll mobilzation in supine midthoracic spine  -GC      Cueing 2 Verbal;Tactile;Demo  -GC              Exercise 3    Exercise Name 3 Bilateral serratus punch in supine  -GC      Cueing 3 Verbal;Tactile  -GC      Reps 3 25  -GC      Time 3 5#  -GC              Exercise 4    Exercise Name 4 Bilateral shoulder EXT vs theraband  -GC      Cueing 4 Verbal;Tactile;Demo  -GC      Reps 4 25  -GC      Time 4 gold  -GC              Exercise 5    Exercise Name 5 Bilateral shoulder rows vs theraband  -GC      Cueing 5 Verbal;Tactile;Demo  -GC      Reps 5 25  -GC      Time 5 gold  -GC              Exercise 6    Exercise Name 6 posterior cuff stretch right  -GC      Cueing 6 Verbal;Tactile;Demo  -GC      Reps 6 10  -GC      Time 6 10 secs  -GC              Exercise 7    Exercise Name 7 lat stretch in sitting with UEs on table  -GC      Cueing 7 Verbal;Tactile;Demo  -GC      Reps 7 10  -GC      Time 7 10 secs  -GC            User Key  (r) = Recorded By, (t) = Taken By, (c) = Cosigned By    Initials Name Provider Type     Davey Rosas PT Physical Therapist                                                Time Calculation:   Start Time: 1100  Stop Time: 1134  Time Calculation (min): 34 min  Timed Charges  24005 - PT Ultrasound Minutes: 8  Total Minutes  Timed Charges Total Minutes: 8   Total Minutes: 8  Therapy Charges for Today     Code Description Service Date Service Provider Modifiers Qty    [id number]  PT ULTRASOUND EA 15 MIN 8/19/2022 Davey Rosas, PT GP 1    [id number]  PT THER PROC EA 15 MIN 8/19/2022 Davey Rosas, PT GP 1                     Davey Rosas, PT  8/19/2022

## 2023-02-12 DIAGNOSIS — M50.30 DDD (DEGENERATIVE DISC DISEASE), CERVICAL: ICD-10-CM

## 2023-02-13 RX ORDER — METHOCARBAMOL 500 MG/1
500 TABLET, FILM COATED ORAL 4 TIMES DAILY
Qty: 60 TABLET | Refills: 1 | Status: SHIPPED | OUTPATIENT
Start: 2023-02-13

## 2023-06-15 ENCOUNTER — TELEPHONE (OUTPATIENT)
Dept: INTERNAL MEDICINE | Facility: CLINIC | Age: 48
End: 2023-06-15
Payer: COMMERCIAL

## 2023-06-15 DIAGNOSIS — Z00.00 ROUTINE HEALTH MAINTENANCE: Primary | ICD-10-CM

## 2024-06-05 ENCOUNTER — TELEPHONE (OUTPATIENT)
Dept: INTERNAL MEDICINE | Facility: CLINIC | Age: 49
End: 2024-06-05
Payer: COMMERCIAL

## 2024-06-06 DIAGNOSIS — Z00.00 ROUTINE HEALTH MAINTENANCE: Primary | ICD-10-CM

## 2024-07-12 ENCOUNTER — OFFICE VISIT (OUTPATIENT)
Dept: INTERNAL MEDICINE | Facility: CLINIC | Age: 49
End: 2024-07-12
Payer: COMMERCIAL

## 2024-07-12 VITALS
BODY MASS INDEX: 30.2 KG/M2 | DIASTOLIC BLOOD PRESSURE: 78 MMHG | TEMPERATURE: 98 F | OXYGEN SATURATION: 98 % | SYSTOLIC BLOOD PRESSURE: 120 MMHG | HEIGHT: 72 IN | HEART RATE: 95 BPM | WEIGHT: 223 LBS

## 2024-07-12 DIAGNOSIS — Z00.00 ENCOUNTER FOR WELLNESS EXAMINATION IN ADULT: Primary | ICD-10-CM

## 2024-07-12 DIAGNOSIS — Z12.11 SCREENING FOR MALIGNANT NEOPLASM OF COLON: ICD-10-CM

## 2024-07-12 DIAGNOSIS — M50.30 DDD (DEGENERATIVE DISC DISEASE), CERVICAL: ICD-10-CM

## 2024-07-12 DIAGNOSIS — M54.12 CERVICAL RADICULOPATHY: ICD-10-CM

## 2024-07-12 DIAGNOSIS — R07.89 ATYPICAL CHEST PAIN: ICD-10-CM

## 2024-07-12 DIAGNOSIS — E78.00 PURE HYPERCHOLESTEROLEMIA: ICD-10-CM

## 2024-07-12 DIAGNOSIS — K21.9 GASTROESOPHAGEAL REFLUX DISEASE WITHOUT ESOPHAGITIS: ICD-10-CM

## 2024-07-12 PROCEDURE — 99396 PREV VISIT EST AGE 40-64: CPT | Performed by: NURSE PRACTITIONER

## 2024-07-12 PROCEDURE — 93000 ELECTROCARDIOGRAM COMPLETE: CPT | Performed by: NURSE PRACTITIONER

## 2024-07-12 RX ORDER — OMEPRAZOLE 20 MG/1
20 CAPSULE, DELAYED RELEASE ORAL DAILY
Qty: 90 CAPSULE | Refills: 3 | Status: SHIPPED | OUTPATIENT
Start: 2024-07-12

## 2024-07-12 NOTE — PATIENT INSTRUCTIONS
Healthy Lifestyle: Nutrition and Exercise    How you nourish your body a critical to your overall health and well being. It is often said that food can be our most powerful medicine or most toxic poison depending on the choices we make.      Meal planning    It is important to plan your meals ahead of time. You will make better choices, instead of ordering a pizza or grabbing a bag of chips when you are starving.          At meals, imagine dividing your plate into fourths:  One-half of your plate is low-carbohydrate vegetables.  One-fourth of your plate is complex carbohydrates - whole grains and/or fruits.  One-fourth of your plate is good quality lean protein.      Meal planning  Eat 3 meals and 2 snacks each day.  Eat at set times. Allow at least 30 minutes for each meal.  Limit carbohydrate intake to no more than 30 g per meal or 130 g per day.   Include a protein-rich food at every meal and snack. Eat 60-80 g of protein a day when possible.  EAT SLOWLY!  Take small bites.  Set your fork or spoon down between each bite and fully chew your food.  When you feel like the next bite will make you full, STOP EATING.  Eat your protein first. If you are still hungry after you have consumed your protein, then move on to eating your low-carbohydrate vegetable. If, after consuming both your protein and low carbohydrate vegetable, you are still hungry, then move on to your complex carbohydrate.      Cooking  Use low-fat cooking methods, such as baking, broiling, boiling, or grilling.  Cook using healthy fats, such as olive, sunflower, grapeseed, or avocado oil.  Avoid adding extra salt, sugar, or fat to foods when cooking.        General instructions  Stay hydrated! Drink at least 48-64 oz of water each day.  For every 8 ounces of coffee or tea you drink, you must consume an additional 8 ounces of water beyond the recommended 48-64 ounces to offset the diuretic effect.  DO NOT DRINK BEVERAGES WITH  CALORIES.  Limit/avoid alcohol intake.  Avoid foods that are high in fat or have added sugar.  Take any vitamin supplements as directed by your health care provider.      Exercise  Get 30-45 continuous minutes of aerobic/cardio activity 5-6 days per week.  If you are not exercising at all, start with 15-20 minutes 3 days per week and gradually build up.

## 2024-07-12 NOTE — PROGRESS NOTES
"DAVID Roth is a 48 y.o. male presenting for Annual Exam    His current/chronic health conditions include:  Patient Active Problem List   Diagnosis    DDD (degenerative disc disease), cervical    Cervical radiculopathy    Gastroesophageal reflux disease without esophagitis    Pure hypercholesterolemia       Outpatient Medications Marked as Taking for the 7/12/24 encounter (Office Visit) with Emely Naidu APRN   Medication Sig Dispense Refill    methocarbamol (Robaxin) 500 MG tablet Take 1 tablet by mouth 4 (Four) Times a Day. 60 tablet 1    OMEPRAZOLE PO Take  by mouth As Needed.            Health Habits:  Nutrition:   Exercise: improved a bit over the past year    Screenings:  PSA: n/a  Colon Cancer: due in November  Tob use:n/a      Back pain waxes and wanes. Worse at night. Will stretch. Rarely needs med.     GERD is under good control w/ QD PPI. Avoids triggers including wine and ice cream late at night. Sometimes needs a second dose. S&S return quickly if he forgets to take. Would like Rx for cost concerns.    He has noted some intermittent L -sided chest pain over the past 6mo w/ exertion. This is a/w nausea. This will resolve over 30-60min w/ rest.      The patient's allergies, current medications, problem list, past medical history, past family history, and past social history were reviewed and updated as appropriate.        OBJECTIVE    Vitals:    07/12/24 1133   BP: 120/78   BP Location: Left arm   Patient Position: Sitting   Cuff Size: Adult   Pulse: 95   Temp: 98 °F (36.7 °C)   TempSrc: Infrared   SpO2: 98%   Weight: 101 kg (223 lb)   Height: 182.9 cm (72.01\")       BP Readings from Last 3 Encounters:   07/12/24 120/78   07/10/23 124/82   07/08/22 120/82       Wt Readings from Last 3 Encounters:   07/12/24 101 kg (223 lb)   07/10/23 101 kg (222 lb 9.6 oz)   07/08/22 100 kg (221 lb 3.2 oz)       Body mass index is 30.24 kg/m².  Nursing notes and vital signs reviewed.    Physical " Exam  Constitutional:       General: He is not in acute distress.     Appearance: Normal appearance. He is well-developed.   HENT:      Head: Normocephalic.      Right Ear: Hearing, tympanic membrane, ear canal and external ear normal.      Left Ear: Hearing, tympanic membrane, ear canal and external ear normal.      Nose: Nose normal. No mucosal edema or rhinorrhea.      Mouth/Throat:      Mouth: Mucous membranes are moist.      Pharynx: Oropharynx is clear. Uvula midline.   Eyes:      General: Lids are normal.      Extraocular Movements: Extraocular movements intact.      Conjunctiva/sclera: Conjunctivae normal.      Pupils: Pupils are equal, round, and reactive to light.   Neck:      Thyroid: No thyroid mass or thyromegaly.   Cardiovascular:      Rate and Rhythm: Regular rhythm.      Pulses: Normal pulses.      Heart sounds: S1 normal and S2 normal. No murmur heard.     No friction rub. No gallop.   Pulmonary:      Effort: Pulmonary effort is normal.      Breath sounds: Normal breath sounds. No wheezing, rhonchi or rales.   Abdominal:      General: Bowel sounds are normal.      Palpations: Abdomen is soft.      Tenderness: There is no abdominal tenderness. There is no guarding.      Hernia: No hernia is present.   Musculoskeletal:         General: No deformity. Normal range of motion.      Cervical back: Normal range of motion and neck supple.   Lymphadenopathy:      Cervical: No cervical adenopathy.   Skin:     General: Skin is warm and dry.      Findings: No lesion or rash.   Neurological:      General: No focal deficit present.      Mental Status: He is alert and oriented to person, place, and time.      Cranial Nerves: No cranial nerve deficit.      Sensory: No sensory deficit.      Motor: Motor function is intact.      Coordination: Coordination is intact.      Gait: Gait normal.      Deep Tendon Reflexes: Reflexes are normal and symmetric.   Psychiatric:         Attention and Perception: He is attentive.          Mood and Affect: Mood and affect normal.         Speech: Speech normal.         Behavior: Behavior normal.         Thought Content: Thought content normal.           Recent Results (from the past 672 hour(s))   CBC (No Diff)    Collection Time: 07/01/24  8:05 AM    Specimen: Blood   Result Value Ref Range    WBC 6.2 3.4 - 10.8 x10E3/uL    RBC 5.26 4.14 - 5.80 x10E6/uL    Hemoglobin 14.3 13.0 - 17.7 g/dL    Hematocrit 44.6 37.5 - 51.0 %    MCV 85 79 - 97 fL    MCH 27.2 26.6 - 33.0 pg    MCHC 32.1 31.5 - 35.7 g/dL    RDW 13.5 11.6 - 15.4 %    Platelets 209 150 - 450 x10E3/uL   Comprehensive Metabolic Panel    Collection Time: 07/01/24  8:05 AM    Specimen: Blood   Result Value Ref Range    Glucose 89 70 - 99 mg/dL    BUN 16 6 - 24 mg/dL    Creatinine 0.92 0.76 - 1.27 mg/dL    EGFR Result 103 >59 mL/min/1.73    BUN/Creatinine Ratio 17 9 - 20    Sodium 142 134 - 144 mmol/L    Potassium 4.4 3.5 - 5.2 mmol/L    Chloride 104 96 - 106 mmol/L    Total CO2 23 20 - 29 mmol/L    Calcium 9.4 8.7 - 10.2 mg/dL    Total Protein 6.5 6.0 - 8.5 g/dL    Albumin 4.3 4.1 - 5.1 g/dL    Globulin 2.2 1.5 - 4.5 g/dL    Total Bilirubin 0.3 0.0 - 1.2 mg/dL    Alkaline Phosphatase 118 44 - 121 IU/L    AST (SGOT) 16 0 - 40 IU/L    ALT (SGPT) 20 0 - 44 IU/L   Lipid Panel With / Chol / HDL Ratio    Collection Time: 07/01/24  8:05 AM    Specimen: Blood   Result Value Ref Range    Total Cholesterol 196 100 - 199 mg/dL    Triglycerides 76 0 - 149 mg/dL    HDL Cholesterol 54 >39 mg/dL    VLDL Cholesterol Shin 14 5 - 40 mg/dL    LDL Chol Calc (NIH) 128 (H) 0 - 99 mg/dL    Chol/HDL Ratio 3.6 0.0 - 5.0 ratio   TSH    Collection Time: 07/01/24  8:05 AM    Specimen: Blood   Result Value Ref Range    TSH 1.700 0.450 - 4.500 uIU/mL         ECG 12 Lead    Date/Time: 7/12/2024 12:23 PM  Performed by: Emely Naidu APRN    Authorized by: Emely Naidu APRN  Previous ECG: no previous ECG available  Rhythm: sinus rhythm  Rate: normal  BPM:  82  Conduction: conduction normal  ST Segments: ST segments normal  T Waves: T waves normal  QRS axis: normal  Other: no other findings    Clinical impression: normal ECG          ASSESSMENT AND PLAN    Diagnoses and all orders for this visit:    1. Encounter for wellness examination in adult (Primary)    2. DDD (degenerative disc disease), cervical  -     Ambulatory Referral to Physical Therapy    3. Cervical radiculopathy  -     Ambulatory Referral to Physical Therapy    4. Gastroesophageal reflux disease without esophagitis  -     Ambulatory Referral to Gastroenterology  -     omeprazole (priLOSEC) 20 MG capsule; Take 1 capsule by mouth Daily.  Dispense: 90 capsule; Refill: 3    5. Pure hypercholesterolemia    6. Atypical chest pain  -     Treadmill Stress Test; Future    7. Screening for malignant neoplasm of colon  -     Ambulatory Referral to Gastroenterology    Other orders  -     ECG 12 Lead          Preventative counseling completed including relevant screenings, appropriate vaccinations, healthy nutrition, and appropriate physical activity. Age-appropriate Screening & Interventions recommended by USPSTF were reviewed with the patient, and Health Maintenance was updated in Epic.  BMI is >= 30 and <35. (Class 1 Obesity). The following options were offered after discussion;: information on healthy weight added to patient's after visit summary             Medications, including side effects, were discussed with the patient. Patient verbalized understanding.  The plan of care was discussed. All questions were answered. Patient verbalized understanding.        Return in about 1 year (around 7/12/2025) for fasting labs one week prior to, Annual physical.

## 2024-07-24 ENCOUNTER — HOSPITAL ENCOUNTER (OUTPATIENT)
Dept: CARDIOLOGY | Facility: HOSPITAL | Age: 49
Discharge: HOME | End: 2024-07-24
Admitting: NURSE PRACTITIONER
Payer: COMMERCIAL

## 2024-07-24 DIAGNOSIS — R07.89 ATYPICAL CHEST PAIN: ICD-10-CM

## 2024-07-24 LAB
BH CV STRESS BP STAGE 1: NORMAL
BH CV STRESS BP STAGE 2: NORMAL
BH CV STRESS BP STAGE 3: NORMAL
BH CV STRESS DURATION MIN STAGE 1: 3
BH CV STRESS DURATION MIN STAGE 2: 3
BH CV STRESS DURATION MIN STAGE 3: 1
BH CV STRESS DURATION SEC STAGE 1: 0
BH CV STRESS DURATION SEC STAGE 2: 0
BH CV STRESS DURATION SEC STAGE 3: 30
BH CV STRESS GRADE STAGE 1: 10
BH CV STRESS GRADE STAGE 2: 12
BH CV STRESS GRADE STAGE 3: 14
BH CV STRESS HR STAGE 1: 100
BH CV STRESS HR STAGE 2: 122
BH CV STRESS HR STAGE 3: 150
BH CV STRESS METS STAGE 1: 4.6
BH CV STRESS METS STAGE 2: 7.1
BH CV STRESS METS STAGE 3: 9.4
BH CV STRESS PROTOCOL 1: NORMAL
BH CV STRESS RECOVERY BP: NORMAL MMHG
BH CV STRESS RECOVERY HR: 94 BPM
BH CV STRESS SPEED STAGE 1: 1.7
BH CV STRESS SPEED STAGE 2: 2.5
BH CV STRESS SPEED STAGE 3: 3.4
BH CV STRESS STAGE 1: 1
BH CV STRESS STAGE 2: 2
BH CV STRESS STAGE 3: 3
MAXIMAL PREDICTED HEART RATE: 173 BPM
PERCENT MAX PREDICTED HR: 86.71 %
STRESS BASELINE BP: NORMAL MMHG
STRESS BASELINE HR: 65 BPM
STRESS O2 SAT REST: 100 %
STRESS PERCENT HR: 102 %
STRESS POST ESTIMATED WORKLOAD: 9.4 METS
STRESS POST EXERCISE DUR MIN: 7 MIN
STRESS POST EXERCISE DUR SEC: 30 SEC
STRESS POST O2 SAT PEAK: 100 %
STRESS POST PEAK BP: NORMAL MMHG
STRESS POST PEAK HR: 150 BPM
STRESS TARGET HR: 147 BPM

## 2024-07-24 PROCEDURE — 93017 CV STRESS TEST TRACING ONLY: CPT

## 2024-07-28 NOTE — PROGRESS NOTES
"Chief Complaint  Heartburn and Colon Cancer Screening    Subjective        Gonzalez Denton, a 48 year-old male new to our practice, presents to [facility] consultation for GERD despite PPI treatment and the need for CLS screening.     GERD - patient has been taking PPI, Omeprazole 20mg daily for years but from time to time,he still has some flare-ups, indigestion, heartburn, noncardiac chest pain (stress test negative as of last week). Nausea without vomiting.  He feels that the above symptoms progressively has gotten worse or more frequent flareups lately.  He denies upper abdominal pain. Denies difficulty swallowing. Uncle has esophageal cancer.     Colonoscopy screening - never had this done. FOBT negative. However, PCP recommended that he should proceed with this endoscopy screening. No known family history of colorectal cancer or polyps. No changes in bowel regimens or blood seen in stool.    No recent change in weight or appetite.   Patient denies personal or family history of colorectal or upper GI cancer.    Objective   Vital Signs:  /88   Pulse 73   Temp 97.8 °F (36.6 °C)   Ht 182.9 cm (72\")   Wt 102 kg (225 lb)   SpO2 99%   BMI 30.52 kg/m²   Estimated body mass index is 30.52 kg/m² as calculated from the following:    Height as of this encounter: 182.9 cm (72\").    Weight as of this encounter: 102 kg (225 lb).       Physical Exam  Vitals and nursing note reviewed.   Constitutional:       General: He is not in acute distress.     Appearance: Normal appearance. He is normal weight. He is not ill-appearing, toxic-appearing or diaphoretic.   Eyes:      General: No scleral icterus.  Cardiovascular:      Rate and Rhythm: Normal rate and regular rhythm.      Pulses: Normal pulses.      Heart sounds: Normal heart sounds.   Pulmonary:      Effort: Pulmonary effort is normal. No respiratory distress.      Breath sounds: Normal breath sounds. No stridor.   Abdominal:      " General: Abdomen is flat. Bowel sounds are normal. There is no distension.      Palpations: Abdomen is soft. There is no mass.      Tenderness: There is no abdominal tenderness. There is no right CVA tenderness, left CVA tenderness, guarding or rebound.      Hernia: No hernia is present.   Skin:     Coloration: Skin is not jaundiced or pale.      Findings: No erythema or rash.   Neurological:      Mental Status: He is alert and oriented to person, place, and time. Mental status is at baseline.   Psychiatric:         Mood and Affect: Mood normal.        Result Review :    The following data was reviewed by: PING Guerra on 07/29/2024:  Lab Results   Component Value Date    GLUCOSE 89 07/01/2024    BUN 16 07/01/2024    CREATININE 0.92 07/01/2024    EGFRRESULT 103 07/01/2024    BCR 17 07/01/2024    K 4.4 07/01/2024    CO2 23 07/01/2024    CALCIUM 9.4 07/01/2024    PROTENTOTREF 6.5 07/01/2024    ALBUMIN 4.3 07/01/2024    BILITOT 0.3 07/01/2024    AST 16 07/01/2024    ALT 20 07/01/2024     Lab Results   Component Value Date    TSH 1.700 07/01/2024     Lab Results   Component Value Date    WBC 6.2 07/01/2024    HGB 14.3 07/01/2024    HCT 44.6 07/01/2024    MCV 85 07/01/2024     07/01/2024            Assessment and Plan     Diagnoses and all orders for this visit:    1. Gastroesophageal reflux disease without esophagitis    2. Encounter for screening colonoscopy        Discussion  Patient is a very pleasant 48-year-old  male, seen and evaluated for the followings:  GERD -antiacid reflux precautions discussed with patient.  GERD, failed PPI.  We will proceed with EGD for further evaluation to further evaluate his persistent acid reflux symptoms.     Colonoscopy screening -benefits and risks discussed with patient.  Agreed to proceed bidirectional endoscopy procedures.       Follow Up     Return for 4 weeks after EGD/CLS.    I have reviewed patient's current medications list, relevant clinical  information necessary for today's encounter. I discussed the clinical impression and treatment plan with the patient, who verbalized understanding and in agreement.  All questions were answered and support was provided.

## 2024-07-29 ENCOUNTER — PREP FOR SURGERY (OUTPATIENT)
Dept: SURGERY | Facility: SURGERY CENTER | Age: 49
End: 2024-07-29
Payer: COMMERCIAL

## 2024-07-29 ENCOUNTER — OFFICE VISIT (OUTPATIENT)
Dept: GASTROENTEROLOGY | Facility: CLINIC | Age: 49
End: 2024-07-29
Payer: COMMERCIAL

## 2024-07-29 VITALS
OXYGEN SATURATION: 99 % | HEIGHT: 72 IN | WEIGHT: 225 LBS | SYSTOLIC BLOOD PRESSURE: 130 MMHG | TEMPERATURE: 97.8 F | BODY MASS INDEX: 30.48 KG/M2 | DIASTOLIC BLOOD PRESSURE: 88 MMHG | HEART RATE: 73 BPM

## 2024-07-29 DIAGNOSIS — Z12.11 ENCOUNTER FOR SCREENING COLONOSCOPY: ICD-10-CM

## 2024-07-29 DIAGNOSIS — K21.9 GASTROESOPHAGEAL REFLUX DISEASE WITHOUT ESOPHAGITIS: ICD-10-CM

## 2024-07-29 PROCEDURE — 99214 OFFICE O/P EST MOD 30 MIN: CPT | Performed by: NURSE PRACTITIONER

## 2024-07-29 RX ORDER — SODIUM CHLORIDE 0.9 % (FLUSH) 0.9 %
10 SYRINGE (ML) INJECTION AS NEEDED
OUTPATIENT
Start: 2024-07-29

## 2024-07-29 RX ORDER — SODIUM CHLORIDE 0.9 % (FLUSH) 0.9 %
3 SYRINGE (ML) INJECTION EVERY 12 HOURS SCHEDULED
OUTPATIENT
Start: 2024-07-29

## 2024-07-29 RX ORDER — SODIUM CHLORIDE, SODIUM LACTATE, POTASSIUM CHLORIDE, CALCIUM CHLORIDE 600; 310; 30; 20 MG/100ML; MG/100ML; MG/100ML; MG/100ML
30 INJECTION, SOLUTION INTRAVENOUS CONTINUOUS PRN
OUTPATIENT
Start: 2024-07-29

## 2024-07-29 NOTE — PATIENT INSTRUCTIONS
I recommend that we proceed with  EGD/CLS for further evaluation for ulcers, structural lesions; colorectal and esophageal cancer screening.  Although EGD/CLS is generally a low risk procedure, but as with any invasive procedure, complications may include bleeding, perforation, the need for surgery as well as complications of anesthesia, in which all these potential events may occur during or after the procedure.     GERD is a chronic disease that occurs when stomach acid flows back into the tube that connects your mouth and stomach. Warning of worsening symptoms may include: Hoarseness, trouble swallowing, too much throat mucus, a lump in the throat, chronic cough, and heartburn.  Some conservative measures or treatment may help, which include:  Diet and lifestyle modification: avoid greasy foods or any foods that will cause heartburn for example chocolate, mint, spicy foods, tomato based products, and citrus. Avoid alcohol, tobacco, and caffeine. Avoid late night heavy meals. Avoid bending forward or stooping after eating. Sleep with head of your bed raised 6-8 inches.

## 2024-08-02 ENCOUNTER — HOSPITAL ENCOUNTER (OUTPATIENT)
Dept: PHYSICAL THERAPY | Facility: HOSPITAL | Age: 49
Setting detail: THERAPIES SERIES
Discharge: HOME | End: 2024-08-02
Payer: COMMERCIAL

## 2024-08-02 DIAGNOSIS — M54.12 CERVICAL RADICULOPATHY: ICD-10-CM

## 2024-08-02 DIAGNOSIS — M50.30 DDD (DEGENERATIVE DISC DISEASE), CERVICAL: Primary | ICD-10-CM

## 2024-08-02 PROCEDURE — 97161 PT EVAL LOW COMPLEX 20 MIN: CPT | Performed by: PHYSICAL THERAPIST

## 2024-08-02 PROCEDURE — 97012 MECHANICAL TRACTION THERAPY: CPT | Performed by: PHYSICAL THERAPIST

## 2024-08-02 NOTE — THERAPY EVALUATION
Outpatient Physical Therapy Ortho Initial Evaluation  GABRIELA Bullard     Patient Name: Gonzalez Denton  : 1975  MRN: [id number]  Today's Date: 2024      Visit Date: 2024    Patient Active Problem List   Diagnosis    DDD (degenerative disc disease), cervical    Cervical radiculopathy    Gastroesophageal reflux disease without esophagitis    Pure hypercholesterolemia    Encounter for screening colonoscopy        Past Medical History:   Diagnosis Date    Asthma 1981    Childhood only    GERD (gastroesophageal reflux disease)     History of kidney stones 2018    5mm stone that caused hydronephrosis requiring lithotripsy and uretal stent placement  and multiple ones since that time requiring lithotripsy. He sees Dr. Forbes once per year     Kidney stone         Past Surgical History:   Procedure Laterality Date    ADENOIDECTOMY      CYSTOSCOPY W/ LASER LITHOTRIPSY      Sees Dr. Forbes yearly     TONSILLECTOMY      VASECTOMY         Visit Dx:     ICD-10-CM ICD-9-CM   1. DDD (degenerative disc disease), cervical  M50.30 722.4   2. Cervical radiculopathy  M54.12 723.4          Patient History       Row Name 24 1100             History    Chief Complaint Pain;Difficulty with daily activities;Tightness  -GC      Type of Pain Neck pain  -GC      Brief Description of Current Complaint Pt reports a several year history of neck and upper back pain for which he has been treated by this therapist 2 years ago. He says he has had a recent flare up of symptoms and was seen by PING Caruso who has referred him to therapy.  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 Pt c/o pain sitting at desk/computer and has pain wiht overhead work  -GC      Is your sleep disturbed? Yes  -GC      Difficulties at work? pt has pain sitting at his desk, doing computer work  -GC      Difficulties with ADL's? pt has pain with overhead work  -GC         Daily Activities    Primary Language English  -GC      Are you able to read Yes  -GC      Are you able to write Yes  -GC      How does patient learn best? Reading  -GC      Teaching needs identified Home Exercise Program;Management of Condition  -GC      Patient is concerned about/has problems with Performing home management (household chores, shopping, care of dependents);Performing job responsibilities/community activities (work, school,;Flexibility;Reaching over head  -GC      Does patient have problems with the following? None  -GC      Barriers to learning None  -GC      Pt Participated in POC and Goals Yes  -GC         Safety    Are you being hurt, hit, or frightened by anyone at home or in your life? No  -GC      Are you being neglected by a caregiver No  -GC                User Key  (r) = Recorded By, (t) = Taken By, (c) = Cosigned By      Initials Name Provider Type    GC Davey Rosas, BEVERLEY Physical Therapist                                    Therapy Education  Given: HEP, Symptoms/condition management, Pain management, Posture/body mechanics  Program: New  How Provided: Verbal, Demonstration  Provided to: Patient  Level of Understanding: Teach back education performed, Verbalized, Demonstrated      PT OP Goals       Row Name 08/02/24 1100          PT Short Term Goals    STG Date to Achieve 08/16/24  -GC     STG 1 Decrease neck pain to 1-2/10 with activity.  -GC     STG 2 Increase upper trap and levator flexibility to WFL with testing.  -GC     STG 3 Increase cervical ROM to WFL all planes with testing.  -     STG 4 Pt will be independent with his HEP issued by this therapist.  -GC        Long Term Goals    LTG  Date to Achieve 08/30/24  -     LTG 1 Decrease neck pain to 0-1/10 with activity.  -     LTG 2 Pt will be able to sit at desk/do computer work without increased pain.  -     LTG 3 Pt will be independent with all ADLs without pain.  -        Time Calculation    PT Goal Re-Cert Due Date 08/30/24  -               User Key  (r) = Recorded By, (t) = Taken By, (c) = Cosigned By      Initials Name Provider Type     Davey Rosas, PT Physical Therapist                     PT Assessment/Plan       Row Name 08/02/24 1100          PT Assessment    Functional Limitations Limitation in home management;Limitations in functional capacity and performance;Performance in work activities  -     Impairments Range of motion;Pain;Impaired flexibility  -     Assessment Comments Pt presents with a history of lower cervical/upper thoracic spine pain that he currently rates as a 3/10 and says he has difficulty sleeping due to the discomfort. He has decreased cervical ROM, decreased upper trap and levator flexibility, and decreased function secondary to the above.  -     Rehab Potential Fair  -     Patient/caregiver participated in establishment of treatment plan and goals Yes  -     Patient would benefit from skilled therapy intervention Yes  -        PT Plan    PT Frequency 1x/week;2x/week  -     Predicted Duration of Therapy Intervention (PT) 4 weeks  -     Planned CPT's? PT EVAL LOW COMPLEXITY: 01854;PT THER PROC EA 15 MIN: 66805;PT MANUAL THERAPY EA 15 MIN: 27884;PT HOT OR COLD PACK TREAT MCARE;PT TRACTION CERVICAL: 97501;PT ULTRASOUND EA 15 MIN: 26546  -               User Key  (r) = Recorded By, (t) = Taken By, (c) = Cosigned By      Initials Name Provider Type     Davey Rosas, PT Physical Therapist                     Modalities       Row Name 08/02/24 1100             Moist Heat    MH Applied Yes  -      Location cervical spine with pt supine  -      PT Moist Heat Minutes 10  -GC      MH Prior  to Rx Yes  -GC         Traction 38118    Traction Type Cervical  20 degrees flexion angle  -GC      PT Traction Rx Minutes 20  -GC      Duration Intermittent  -GC      Position Supine  -GC      Weight 18  -GC      Hold 90  -GC      Relax 30  -GC                User Key  (r) = Recorded By, (t) = Taken By, (c) = Cosigned By      Initials Name Provider Type     Davey Rosas, PT Physical Therapist                                    Outcome Measure Options: Neck Disability Index (NDI)  Neck Disability Index  Section 1 - Pain Intensity: The pain comes and goes and is moderate.  Section 2 - Personal Care: I can look after myself normally without causing extra pain.  Section 3 - Lifting: I can lift heavy weights, but it causes extra pain.  Section 4 - Reading: I can read as much as I want with no neck pain.  Section 5 - Headaches: I have no headaches at all.  Section 6 - Concentration: I can concentrate fully when I want to without difficulty.  Section 7 - Work: I can do as much work as I want.  Section 8 - Driving: I can drive my car without neck pain  Section 9 - Sleeping: My sleep is slightly disturbed (less than 1 hour sleepless).  Section 10 - Recreation: I am able to engage in all recreational activities with some pain in my neck.  Neck Disability Index Score: 5      Time Calculation:     Start Time: 1100  Stop Time: 1154  Time Calculation (min): 54 min  Untimed Charges  PT Traction Rx Minutes: 20  PT Moist Heat Minutes: 10  Total Minutes  Untimed Charges Total Minutes: 30   Total Minutes: 30     Therapy Charges for Today       Code Description Service Date Service Provider Modifiers Qty    60088271899 HC PT EVAL LOW COMPLEXITY 2 8/2/2024 Davey Rosas, PT GP 1    29511427355 HC PT-TRACTION MECHANICAL 8/2/2024 Davey Rosas, PT  1            PT G-Codes  Outcome Measure Options: Neck Disability Index (NDI)  Neck Disability Index Score: 5         Davey Rosas PT  8/2/2024

## 2024-08-09 ENCOUNTER — HOSPITAL ENCOUNTER (OUTPATIENT)
Dept: PHYSICAL THERAPY | Facility: HOSPITAL | Age: 49
Setting detail: THERAPIES SERIES
Discharge: HOME | End: 2024-08-09
Payer: COMMERCIAL

## 2024-08-09 DIAGNOSIS — M54.12 CERVICAL RADICULOPATHY: Primary | ICD-10-CM

## 2024-08-09 PROCEDURE — 97012 MECHANICAL TRACTION THERAPY: CPT | Performed by: PHYSICAL THERAPIST

## 2024-08-09 NOTE — THERAPY TREATMENT NOTE
Outpatient Physical Therapy Ortho Treatment Note  GABRIELA Bullard     Patient Name: Gonzalez Denton  : 1975  MRN: [id number]  Today's Date: 2024      Visit Date: 2024    Visit Dx:    ICD-10-CM ICD-9-CM   1. Cervical radiculopathy  M54.12 723.4       Patient Active Problem List   Diagnosis    DDD (degenerative disc disease), cervical    Cervical radiculopathy    Gastroesophageal reflux disease without esophagitis    Pure hypercholesterolemia    Encounter for screening colonoscopy        Past Medical History:   Diagnosis Date    Asthma 1981    Childhood only    GERD (gastroesophageal reflux disease)     History of kidney stones 2018    5mm stone that caused hydronephrosis requiring lithotripsy and uretal stent placement  and multiple ones since that time requiring lithotripsy. He sees Dr. Forbes once per year     Kidney stone         Past Surgical History:   Procedure Laterality Date    ADENOIDECTOMY      CYSTOSCOPY W/ LASER LITHOTRIPSY      Sees Dr. Forbes yearly     TONSILLECTOMY      VASECTOMY                          PT Assessment/Plan       Row Name 24 1100          PT Assessment    Assessment Comments Pt is responding well with decreased symptoms being reported.  -GC        PT Plan    PT Plan Comments Will e-ck again next week.  -GC               User Key  (r) = Recorded By, (t) = Taken By, (c) = Cosigned By      Initials Name Provider Type    Davey Starr, PT Physical Therapist                     Modalities       Row Name 24 1100             Subjective    Subjective Comments Pt reports he is having less pain and less frequency of his symptoms.  -GC         Moist Heat    Location cervical spine with pt supine  -GC      PT Moist Heat Minutes 10  -GC      MH Prior to Rx Yes  -GC         Traction 16369    Traction Type Cervical  20 degrees flexion angle  -GC      PT Traction Rx Minutes 20  -GC      Position Supine  -GC      Weight 18  -GC      Hold 90  -GC      Relax 30   -         Functional Testing    Outcome Measure Options Neck Disability Index (NDI)  -                User Key  (r) = Recorded By, (t) = Taken By, (c) = Cosigned By      Initials Name Provider Type     Davey Rosas PT Physical Therapist                   OP Exercises       Row Name 08/09/24 1100             Subjective    Subjective Comments Pt reports he is having less pain and less frequency of his symptoms.  -                User Key  (r) = Recorded By, (t) = Taken By, (c) = Cosigned By      Initials Name Provider Type     Davey Rosas PT Physical Therapist                                          Outcome Measure Options: Neck Disability Index (NDI)         Time Calculation:   Start Time: 1100  Stop Time: 1142  Time Calculation (min): 42 min  Untimed Charges  PT Traction Rx Minutes: 20  PT Moist Heat Minutes: 10  Total Minutes  Untimed Charges Total Minutes: 30   Total Minutes: 30  Therapy Charges for Today       Code Description Service Date Service Provider Modifiers Qty    07883535200  PT-TRACTION MECHANICAL 8/9/2024 Davey Rosas, PT  1            PT G-Codes  Outcome Measure Options: Neck Disability Index (NDI)         Davey Rosas, PT  8/9/2024

## 2024-08-16 ENCOUNTER — HOSPITAL ENCOUNTER (OUTPATIENT)
Dept: PHYSICAL THERAPY | Facility: HOSPITAL | Age: 49
Setting detail: THERAPIES SERIES
Discharge: HOME | End: 2024-08-16
Payer: COMMERCIAL

## 2024-08-16 DIAGNOSIS — M50.30 DDD (DEGENERATIVE DISC DISEASE), CERVICAL: ICD-10-CM

## 2024-08-16 DIAGNOSIS — M54.12 CERVICAL RADICULOPATHY: Primary | ICD-10-CM

## 2024-08-16 PROCEDURE — 97012 MECHANICAL TRACTION THERAPY: CPT | Performed by: PHYSICAL THERAPIST

## 2024-08-16 NOTE — THERAPY TREATMENT NOTE
Outpatient Physical Therapy Ortho Treatment Note  GABREILA Bullard     Patient Name: Gonzalez Denton  : 1975  MRN: [id number]  Today's Date: 2024      Visit Date: 2024    Visit Dx:    ICD-10-CM ICD-9-CM   1. Cervical radiculopathy  M54.12 723.4   2. DDD (degenerative disc disease), cervical  M50.30 722.4       Patient Active Problem List   Diagnosis    DDD (degenerative disc disease), cervical    Cervical radiculopathy    Gastroesophageal reflux disease without esophagitis    Pure hypercholesterolemia    Encounter for screening colonoscopy        Past Medical History:   Diagnosis Date    Asthma 1981    Childhood only    GERD (gastroesophageal reflux disease)     History of kidney stones 2018    5mm stone that caused hydronephrosis requiring lithotripsy and uretal stent placement  and multiple ones since that time requiring lithotripsy. He sees Dr. Forbes once per year     Kidney stone         Past Surgical History:   Procedure Laterality Date    ADENOIDECTOMY      CYSTOSCOPY W/ LASER LITHOTRIPSY      Sees Dr. Forbes yearly     TONSILLECTOMY      VASECTOMY                          PT Assessment/Plan       Row Name 24 1130          PT Assessment    Assessment Comments Pt is reporting decreased symptoms.  -GC        PT Plan    PT Plan Comments Continue per POC  -GC               User Key  (r) = Recorded By, (t) = Taken By, (c) = Cosigned By      Initials Name Provider Type    GC Davey Rosas, PT Physical Therapist                     Modalities       Row Name 24 1131             Subjective    Subjective Comments Pt states the symptoms are better.  -GC         Moist Heat    MH Applied Yes  -GC      Location cervical spine with pt supine  -GC      MH Prior to Rx Yes  -GC         Traction 86645    Traction Type Cervical  20 degrees flexion angle  -GC      PT Traction Rx Minutes 25  -GC      Position Supine  -GC      Weight 25  -GC      Hold 120  -GC      Relax 30  -GC          Functional Testing    Outcome Measure Options Neck Disability Index (NDI)  -                User Key  (r) = Recorded By, (t) = Taken By, (c) = Cosigned By      Initials Name Provider Type     Davey Rosas PT Physical Therapist                   OP Exercises       Row Name 08/16/24 1130             Subjective    Subjective Comments Pt states the symptoms are better.  -                User Key  (r) = Recorded By, (t) = Taken By, (c) = Cosigned By      Initials Name Provider Type     Davey Rosas PT Physical Therapist                                          Outcome Measure Options: Neck Disability Index (NDI)         Time Calculation:   Start Time: 1130  Stop Time: 1204  Time Calculation (min): 34 min  Untimed Charges  PT Traction Rx Minutes: 25  Total Minutes  Untimed Charges Total Minutes: 25   Total Minutes: 25  Therapy Charges for Today       Code Description Service Date Service Provider Modifiers Qty    47746755167  PT-TRACTION MECHANICAL 8/16/2024 Davey Rosas, PT  1            PT G-Codes  Outcome Measure Options: Neck Disability Index (NDI)         Davey Rosas PT  8/16/2024

## 2024-08-23 ENCOUNTER — HOSPITAL ENCOUNTER (OUTPATIENT)
Dept: PHYSICAL THERAPY | Facility: HOSPITAL | Age: 49
Setting detail: THERAPIES SERIES
Discharge: HOME | End: 2024-08-23
Payer: COMMERCIAL

## 2024-08-23 DIAGNOSIS — M54.12 CERVICAL RADICULOPATHY: Primary | ICD-10-CM

## 2024-08-23 PROCEDURE — 97012 MECHANICAL TRACTION THERAPY: CPT | Performed by: PHYSICAL THERAPIST

## 2024-08-23 NOTE — THERAPY TREATMENT NOTE
Outpatient Physical Therapy Ortho Treatment Note  GABRIELA Bullard     Patient Name: Gonzalez Denton  : 1975  MRN: [id number]  Today's Date: 2024      Visit Date: 2024    Visit Dx:    ICD-10-CM ICD-9-CM   1. Cervical radiculopathy  M54.12 723.4       Patient Active Problem List   Diagnosis    DDD (degenerative disc disease), cervical    Cervical radiculopathy    Gastroesophageal reflux disease without esophagitis    Pure hypercholesterolemia    Encounter for screening colonoscopy        Past Medical History:   Diagnosis Date    Asthma 1981    Childhood only    GERD (gastroesophageal reflux disease)     History of kidney stones 2018    5mm stone that caused hydronephrosis requiring lithotripsy and uretal stent placement  and multiple ones since that time requiring lithotripsy. He sees Dr. Forbes once per year     Kidney stone         Past Surgical History:   Procedure Laterality Date    ADENOIDECTOMY      CYSTOSCOPY W/ LASER LITHOTRIPSY      Sees Dr. Forbes yearly     TONSILLECTOMY      VASECTOMY                          PT Assessment/Plan       Row Name 24 1134          PT Assessment    Assessment Comments Pt is reporting decreased symptoms.  -GC        PT Plan    PT Plan Comments Will continue per POC.  -GC               User Key  (r) = Recorded By, (t) = Taken By, (c) = Cosigned By      Initials Name Provider Type    GC Davey Rosas, PT Physical Therapist                     Modalities       Row Name 24 113             Subjective    Subjective Comments Pt states he has noticed some improvement over the last 2-3 days. He was sore after his last treatment.  -GC         Moist Heat    MH Applied Yes  -GC      Location cervical spine with pt supine  -GC      MH Prior to Rx Yes  -GC         Traction 92913    Traction Type Cervical  20 degrees flexion angle  -GC      PT Traction Rx Minutes 25  -GC      Position Supine  -GC      Weight 25  -GC      Hold 180  -GC      Relax 30  -GC          Functional Testing    Outcome Measure Options Neck Disability Index (NDI)  -                User Key  (r) = Recorded By, (t) = Taken By, (c) = Cosigned By      Initials Name Provider Type    Davey Starr PT Physical Therapist                   OP Exercises       Row Name 08/23/24 1130             Subjective    Subjective Comments Pt states he has noticed some improvement over the last 2-3 days. He was sore after his last treatment.  -                User Key  (r) = Recorded By, (t) = Taken By, (c) = Cosigned By      Initials Name Provider Type    Davey Starr PT Physical Therapist                                          Outcome Measure Options: Neck Disability Index (NDI)         Time Calculation:   Start Time: 1130  Stop Time: 1214  Time Calculation (min): 44 min  Untimed Charges  PT Traction Rx Minutes: 25  Total Minutes  Untimed Charges Total Minutes: 25   Total Minutes: 25  Therapy Charges for Today       Code Description Service Date Service Provider Modifiers Qty    70166938072 HC PT-TRACTION MECHANICAL 8/23/2024 Davey Rosas, PT  1            PT G-Codes  Outcome Measure Options: Neck Disability Index (NDI)         Davey Rosas PT  8/23/2024

## 2024-08-30 ENCOUNTER — HOSPITAL ENCOUNTER (OUTPATIENT)
Dept: PHYSICAL THERAPY | Facility: HOSPITAL | Age: 49
Setting detail: THERAPIES SERIES
Discharge: HOME | End: 2024-08-30
Payer: COMMERCIAL

## 2024-08-30 DIAGNOSIS — M54.12 CERVICAL RADICULOPATHY: Primary | ICD-10-CM

## 2024-08-30 DIAGNOSIS — M50.30 DDD (DEGENERATIVE DISC DISEASE), CERVICAL: ICD-10-CM

## 2024-10-09 NOTE — SIGNIFICANT NOTE
Education provided the Patient on the following:    - Nothing to Eat or Drink after MN the night before the procedure    - Avoid red/purple fluids while completing their bowel prep as ordered by physician  -Contact Gastrointerologist office for any questions about specific details regarding colon prep    -You will need to have someone drive you home after your colonoscopy and remain with you for 24 hours after the procedure  - The date of your Surgery, you may have one visitor at bedside or within 10-15 minutes of [facility]  - Please wear warm socks when you arrive for your colonoscopy  - Remove all jewelry and leave any valuables before arriving the day of your procedure (all will have to be removed before leaving preop)  - You will need to arrive at 1145 on 10/11/24 for your colonoscopy    -Feel free to contact us at: [phone] with any additional questions/concerns

## 2024-10-11 ENCOUNTER — ANESTHESIA EVENT (OUTPATIENT)
Dept: SURGERY | Facility: SURGERY CENTER | Age: 49
End: 2024-10-11
Payer: COMMERCIAL

## 2024-10-11 ENCOUNTER — ANESTHESIA (OUTPATIENT)
Dept: SURGERY | Facility: SURGERY CENTER | Age: 49
End: 2024-10-11
Payer: COMMERCIAL

## 2024-10-11 ENCOUNTER — HOSPITAL ENCOUNTER (OUTPATIENT)
Facility: SURGERY CENTER | Age: 49
Setting detail: HOSPITAL OUTPATIENT SURGERY
Discharge: HOME | End: 2024-10-11
Attending: INTERNAL MEDICINE | Admitting: INTERNAL MEDICINE
Payer: COMMERCIAL

## 2024-10-11 VITALS
DIASTOLIC BLOOD PRESSURE: 88 MMHG | HEIGHT: 72 IN | HEART RATE: 71 BPM | BODY MASS INDEX: 29.8 KG/M2 | RESPIRATION RATE: 16 BRPM | SYSTOLIC BLOOD PRESSURE: 126 MMHG | OXYGEN SATURATION: 97 % | TEMPERATURE: 97.8 F | WEIGHT: 220 LBS

## 2024-10-11 DIAGNOSIS — Z12.11 ENCOUNTER FOR SCREENING COLONOSCOPY: ICD-10-CM

## 2024-10-11 DIAGNOSIS — K21.9 GASTROESOPHAGEAL REFLUX DISEASE WITHOUT ESOPHAGITIS: ICD-10-CM

## 2024-10-11 PROCEDURE — 45385 COLONOSCOPY W/LESION REMOVAL: CPT | Performed by: INTERNAL MEDICINE

## 2024-10-11 PROCEDURE — 25010000002 PROPOFOL 1000 MG/100ML EMULSION: Performed by: ANESTHESIOLOGY

## 2024-10-11 PROCEDURE — 25010000002 LIDOCAINE 2% SOLUTION: Performed by: ANESTHESIOLOGY

## 2024-10-11 PROCEDURE — 88305 TISSUE EXAM BY PATHOLOGIST: CPT | Performed by: INTERNAL MEDICINE

## 2024-10-11 PROCEDURE — 25010000002 PROPOFOL 200 MG/20ML EMULSION: Performed by: ANESTHESIOLOGY

## 2024-10-11 PROCEDURE — 43239 EGD BIOPSY SINGLE/MULTIPLE: CPT | Performed by: INTERNAL MEDICINE

## 2024-10-11 PROCEDURE — 25810000003 LACTATED RINGERS PER 1000 ML: Performed by: NURSE PRACTITIONER

## 2024-10-11 RX ORDER — PROPOFOL 10 MG/ML
INJECTION, EMULSION INTRAVENOUS CONTINUOUS PRN
Status: DISCONTINUED | OUTPATIENT
Start: 2024-10-11 | End: 2024-10-11 | Stop reason: SURG

## 2024-10-11 RX ORDER — SODIUM CHLORIDE 0.9 % (FLUSH) 0.9 %
10 SYRINGE (ML) INJECTION AS NEEDED
Status: DISCONTINUED | OUTPATIENT
Start: 2024-10-11 | End: 2024-10-11 | Stop reason: HOSPADM

## 2024-10-11 RX ORDER — LIDOCAINE HYDROCHLORIDE 20 MG/ML
INJECTION, SOLUTION INFILTRATION; PERINEURAL AS NEEDED
Status: DISCONTINUED | OUTPATIENT
Start: 2024-10-11 | End: 2024-10-11 | Stop reason: SURG

## 2024-10-11 RX ORDER — SODIUM CHLORIDE 0.9 % (FLUSH) 0.9 %
3 SYRINGE (ML) INJECTION EVERY 12 HOURS SCHEDULED
Status: DISCONTINUED | OUTPATIENT
Start: 2024-10-11 | End: 2024-10-11 | Stop reason: HOSPADM

## 2024-10-11 RX ORDER — OMEPRAZOLE 40 MG/1
40 CAPSULE, DELAYED RELEASE ORAL DAILY
Qty: 30 CAPSULE | Refills: 5 | Status: SHIPPED | OUTPATIENT
Start: 2024-10-11

## 2024-10-11 RX ORDER — PROPOFOL 10 MG/ML
INJECTION, EMULSION INTRAVENOUS AS NEEDED
Status: DISCONTINUED | OUTPATIENT
Start: 2024-10-11 | End: 2024-10-11 | Stop reason: SURG

## 2024-10-11 RX ORDER — SODIUM CHLORIDE, SODIUM LACTATE, POTASSIUM CHLORIDE, CALCIUM CHLORIDE 600; 310; 30; 20 MG/100ML; MG/100ML; MG/100ML; MG/100ML
30 INJECTION, SOLUTION INTRAVENOUS CONTINUOUS PRN
Status: DISCONTINUED | OUTPATIENT
Start: 2024-10-11 | End: 2024-10-11 | Stop reason: HOSPADM

## 2024-10-11 RX ADMIN — LIDOCAINE HYDROCHLORIDE 100 MG: 20 INJECTION, SOLUTION INFILTRATION; PERINEURAL at 12:45

## 2024-10-11 RX ADMIN — PROPOFOL 150 MG: 10 INJECTION, EMULSION INTRAVENOUS at 12:45

## 2024-10-11 RX ADMIN — PROPOFOL 160 MCG/KG/MIN: 10 INJECTION, EMULSION INTRAVENOUS at 12:47

## 2024-10-11 RX ADMIN — SODIUM CHLORIDE, POTASSIUM CHLORIDE, SODIUM LACTATE AND CALCIUM CHLORIDE 30 ML/HR: 600; 310; 30; 20 INJECTION, SOLUTION INTRAVENOUS at 12:23

## 2024-10-11 NOTE — DISCHARGE INSTRUCTIONS
ENDOSCOPY - EGD/COLONOSCOPY       ADULT CARE DISCHARGE  INSTRUCTIONS     Instructions for the next 24 hours after your Procedure:     Adult supervision     Do NOT drink any alcohol      Do not work today     NO important decisions     DO NOT sign any legal documents     You may shower/ bathe       DO NOT  DRIVE or operate machinery     Resume normal activity tomorrow     Symptoms you may temporarily experience:      Sore Throat     Hoarseness     Bloating/Cramping     Dizziness     IV Irritation/tenderness     Gas or Belching     Slight fever     Small amount of blood in vomit or stool       Call Your Doctor for the following Problems: ______________________     ______________________     Fever of 101 degrees or higher       Sharp abdominal  pain     Red streak up the arm from the IV site     Severe cramping        Large amount of blood in stool or vomit           Discharge  Diet:     Avoid spicy/ greasy foods     Avoid any food that will cause more gas or bloating       *** Seek IMMEDIATE medical attention and call 911 if you develop symptoms such as:     Chest pain     Shortness of breath     Severe bleeding

## 2024-10-11 NOTE — ANESTHESIA POSTPROCEDURE EVALUATION
"Patient: Gonzalez Denton    Procedure Summary       Date: 10/11/24 Room / Location: [facility]    Anesthesia Start: 1238 Anesthesia Stop: 1310    Procedures:       ESOPHAGOGASTRODUODENOSCOPY WITH BIOPSY      COLONOSCOPY WITH POLYPECTOMY Diagnosis:       Gastroesophageal reflux disease without esophagitis      Encounter for screening colonoscopy      (Gastroesophageal reflux disease without esophagitis [K21.9])      (Encounter for screening colonoscopy [Z12.11])    Surgeons: Zeus Kolb MD Provider: Herbert Munoz MD    Anesthesia Type: MAC ASA Status: 2            Anesthesia Type: MAC    Vitals  Vitals Value Taken Time   /93 10/11/24 1312   Temp 36.6 °C (97.8 °F) 10/11/24 1309   Pulse 81 10/11/24 1312   Resp 14 10/11/24 1309   SpO2 93 % 10/11/24 1312   Vitals shown include unfiled device data.        Post Anesthesia Care and Evaluation    Pain management: adequate    Airway patency: patent  Anesthetic complications: No anesthetic complications    Cardiovascular status: acceptable  Respiratory status: acceptable  Hydration status: acceptable    Comments: /93 (BP Location: Left arm, Patient Position: Lying)   Pulse 72   Temp 36.6 °C (97.8 °F) (Temporal)   Resp 14   Ht 182.9 cm (72\")   Wt 99.8 kg (220 lb)   SpO2 94%   BMI 29.84 kg/m²       "

## 2024-10-11 NOTE — H&P
No chief complaint on file.      HPI  GERD  screening         Problem List:    Patient Active Problem List   Diagnosis    DDD (degenerative disc disease), cervical    Cervical radiculopathy    Gastroesophageal reflux disease without esophagitis    Pure hypercholesterolemia    Encounter for screening colonoscopy       Medical History:    Past Medical History:   Diagnosis Date    Asthma 1981    Childhood only    GERD (gastroesophageal reflux disease)     History of kidney stones 06/01/2018    5mm stone that caused hydronephrosis requiring lithotripsy and uretal stent placement 2008 and multiple ones since that time requiring lithotripsy. He sees Dr. Forbes once per year     Kidney stone         Social History:    Social History     Socioeconomic History    Marital status:    Tobacco Use    Smoking status: Never    Smokeless tobacco: Never   Vaping Use    Vaping status: Never Used   Substance and Sexual Activity    Alcohol use: Yes     Alcohol/week: 2.0 standard drinks of alcohol     Types: 2 Cans of beer per week     Comment: 1 or less    Drug use: No    Sexual activity: Yes     Partners: Female     Birth control/protection: Vasectomy       Family History:   Family History   Problem Relation Age of Onset    Hypertension Mother     Ovarian cancer Mother 54    Cancer Mother         Ovarian    Early death Mother     Hypertension Father     Gout Father     Asthma Father     Depression Father     Alcohol abuse Father     Hearing loss Father     Mental illness Father     Alcohol abuse Brother     Mental illness Brother     Depression Brother     Drug abuse Brother     Esophageal cancer Paternal Uncle     Cancer Paternal Uncle         Esophagus    Diabetes Paternal Uncle     Skin cancer Maternal Grandmother     Heart failure Maternal Grandmother     Heart disease Maternal Grandmother     Asthma Paternal Grandmother     Depression Paternal Grandmother     Mental illness Paternal Grandmother     Diabetes Paternal  Grandfather     Stroke Paternal Grandfather     COPD Paternal Grandfather     Hearing loss Paternal Grandfather     Heart disease Paternal Grandfather     Mental illness Brother     Alcohol abuse Brother     Depression Brother     Pancreatic cancer Maternal Grandfather     Cancer Maternal Grandfather         Pancreatic    Diabetes type I Son     Asthma Son     Diabetes Son     Asthma Daughter        Surgical History:   Past Surgical History:   Procedure Laterality Date    ADENOIDECTOMY      CYSTOSCOPY W/ LASER LITHOTRIPSY      Sees Dr. Forbes yearly     TONSILLECTOMY      VASECTOMY         No current facility-administered medications for this encounter.    Current Outpatient Medications:     methocarbamol (Robaxin) 500 MG tablet, Take 1 tablet by mouth 4 (Four) Times a Day., Disp: 60 tablet, Rfl: 1    omeprazole (priLOSEC) 20 MG capsule, Take 1 capsule by mouth Daily., Disp: 90 capsule, Rfl: 3    Allergies: No Known Allergies     The following portions of the patient's history were reviewed by me and updated as appropriate: review of systems, allergies, current medications, past family history, past medical history, past social history, past surgical history and problem list.    There were no vitals filed for this visit.    PHYSICAL EXAM:    CONSTITUTIONAL:  today's vital signs reviewed by me  GASTROINTESTINAL: abdomen is soft nontender nondistended with normal active bowel sounds, no masses are appreciated    Assessment/ Plan  GERD  Screening    Egd and colonoscopy    Risks and benefits as well as alternatives to endoscopic evaluation were explained to the patient and they voiced understanding and wish to proceed.  These risks include but are not limited to the risk of bleeding, perforation, adverse reaction to sedation, and missed lesions.  The patient was given the opportunity to ask questions prior to the endoscopic procedure.

## 2024-10-11 NOTE — ANESTHESIA PREPROCEDURE EVALUATION
Anesthesia Evaluation     NPO Solid Status: > 8 hours             Airway   Mallampati: II  Dental      Pulmonary    (-) sleep apnea, not a smoker    ROS comment: Negative patient screen for LISA    Cardiovascular     (-) hypertension      Neuro/Psych  GI/Hepatic/Renal/Endo    (+) obesity, GERD    Musculoskeletal     Abdominal    Substance History      OB/GYN          Other                    Anesthesia Plan    ASA 2     MAC       Anesthetic plan, risks, benefits, and alternatives have been provided, discussed and informed consent has been obtained with: patient.    CODE STATUS:

## 2024-10-14 LAB
CYTO UR: NORMAL
LAB AP CASE REPORT: NORMAL
PATH REPORT.FINAL DX SPEC: NORMAL
PATH REPORT.GROSS SPEC: NORMAL

## 2024-10-29 NOTE — PROGRESS NOTES
Chief Complaint  Heartburn    Subjective           History of Present Illness  Gonzalez Denton is a 49-year-old male who presents for follow-up with known history of cervical radiculopathy, neck pain, high cholesterol, and long standing history of GERD symptoms mimics the cardiac symptoms, but the stress test workup was negative.     He never had colonoscopy and fecal occult blood test was negative. Last time, we recommended that he underwent both upper and lower endoscopy and he is here to talk about the result.    He reports that his heartburn has been well-managed since his medication dosage was increased (Omeprazole 40mg daily).  Denies recurrent symptoms of indigestion, nausea vomiting, difficulty swallowing.  Acid reflux seems to be much more under controlled. He denies any changes in bowel habits or presence of blood in his stool.    FAMILY HISTORY  He denies any family history of colorectal cancer or polyp.     Upper GI Endoscopy (10/11/2024 12:35)   - Normal esophagus.   - Erythematous mucosa in the antrum. Biopsied.   - Normal examined duodenum.   - The examination was otherwise normal.    Colonoscopy (10/11/2024 12:33)   - A 6 mm polyp was found in the transverse colon. The polyp was sessile. The polyp was removed with a cold snare.   - Non-bleeding internal hemorrhoids were found during retroflexion. The hemorrhoids were mild.   - The exam was otherwise without abnormality.   - The rectum, recto-sigmoid colon, sigmoid colon, descending colon, splenic flexure, hepatic flexure, ascending colon, cecum, appendiceal orifice and ileocecal valve appeared normal.    Tissue Pathology Exam (10/11/2024 12:49)   1. Stomach, antrum, biopsy:  A. Gastric antral mucosa with no significant histopathologic changes.  B. Negative for H. pylori-type organisms on routine stain.    2. Transverse colon, polyp, polypectomy:  A. Tubular adenoma.    Repeat CLS in 5 year    Objective   Vital Signs:  /70   Pulse 73   Temp 96 °F  "(35.6 °C)   Ht 182.9 cm (72.01\")   Wt 103 kg (227 lb 8 oz)   SpO2 98%   BMI 30.85 kg/m²   Estimated body mass index is 30.85 kg/m² as calculated from the following:    Height as of this encounter: 182.9 cm (72.01\").    Weight as of this encounter: 103 kg (227 lb 8 oz).       Physical Exam  Vitals and nursing note reviewed.   Constitutional:       General: He is not in acute distress.     Appearance: Normal appearance. He is normal weight. He is not ill-appearing, toxic-appearing or diaphoretic.   Eyes:      General: No scleral icterus.     Conjunctiva/sclera: Conjunctivae normal.   Cardiovascular:      Rate and Rhythm: Normal rate and regular rhythm.      Pulses: Normal pulses.      Heart sounds: Normal heart sounds.   Pulmonary:      Effort: Pulmonary effort is normal. No respiratory distress.      Breath sounds: Normal breath sounds. No stridor.   Abdominal:      General: Abdomen is flat. Bowel sounds are normal. There is no distension.      Palpations: Abdomen is soft. There is no mass.      Tenderness: There is no abdominal tenderness. There is no right CVA tenderness, left CVA tenderness, guarding or rebound.      Hernia: No hernia is present.   Skin:     Coloration: Skin is not jaundiced or pale.      Findings: No erythema or rash.   Neurological:      Mental Status: He is alert and oriented to person, place, and time. Mental status is at baseline.   Psychiatric:         Mood and Affect: Mood normal.               Assessment and Plan     Diagnoses and all orders for this visit:    1. Tubular adenoma of colon (Primary)    2. Gastroesophageal reflux disease without esophagitis      DISCUSSION  Patient presents to our practice today for the following concerns:  Assessment & Plan  1. Gastroesophageal Reflux Disease (GERD).  The patient has been experiencing heartburn and indigestion, which have been managed with omeprazole. He was previously on omeprazole 20 mg daily but has increased to 40 mg daily, which has " controlled his symptoms. He was advised to avoid spicy and greasy foods and not to lie down immediately after eating.   A gradual reduction of omeprazole to 20 mg was suggested after 6-8 weeks, with the possibility of discontinuing it entirely if symptoms are manageable. Over-the-counter vitamin D and calcium supplements were recommended to mitigate potential bone thinning. He was also advised to consult his primary care physician regarding his vitamin D level and DEXA scan. If symptoms recur, he may resume omeprazole 40 mg.    2. Tubular Adenoma.  Nonbleeding internal hemorrhoids  A 6 mm polyp was found in the transverse colon during a colonoscopy, which was identified as a tubular adenoma.  A repeat colonoscopy is recommended in 5 years (October 2029).    Follow-up  Return in 1 year for follow-up.         Much of this encounter note is an electronic transcription/translation of spoken language to printed text. The electronic translation of spoken language may permit erroneous, or at times, nonsensical words or phrases to be inadvertently transcribed; Although I have reviewed the note for such errors, some may still exist.    Follow Up     No follow-ups on file.  Patient was given instructions and counseling regarding his condition or for health maintenance advice. Please see specific information pulled into the AVS if appropriate.     Patient or patient representative verbalized consent for the use of Ambient Listening during the visit with  PING Guerra for chart documentation. 11/4/2024  08:34 EST

## 2024-11-04 ENCOUNTER — OFFICE VISIT (OUTPATIENT)
Dept: GASTROENTEROLOGY | Facility: CLINIC | Age: 49
End: 2024-11-04
Payer: COMMERCIAL

## 2024-11-04 VITALS
HEART RATE: 73 BPM | HEIGHT: 72 IN | SYSTOLIC BLOOD PRESSURE: 110 MMHG | BODY MASS INDEX: 30.81 KG/M2 | OXYGEN SATURATION: 98 % | TEMPERATURE: 96 F | DIASTOLIC BLOOD PRESSURE: 70 MMHG | WEIGHT: 227.5 LBS

## 2024-11-04 DIAGNOSIS — D12.6 TUBULAR ADENOMA OF COLON: Primary | ICD-10-CM

## 2024-11-04 DIAGNOSIS — K21.9 GASTROESOPHAGEAL REFLUX DISEASE WITHOUT ESOPHAGITIS: ICD-10-CM

## 2024-11-04 PROCEDURE — 99214 OFFICE O/P EST MOD 30 MIN: CPT | Performed by: NURSE PRACTITIONER

## 2025-04-07 DIAGNOSIS — K21.9 GASTROESOPHAGEAL REFLUX DISEASE WITHOUT ESOPHAGITIS: Primary | ICD-10-CM

## 2025-04-07 RX ORDER — OMEPRAZOLE 40 MG/1
40 CAPSULE, DELAYED RELEASE ORAL DAILY
Qty: 30 CAPSULE | Refills: 5 | Status: SHIPPED | OUTPATIENT
Start: 2025-04-07

## 2025-06-06 ENCOUNTER — OFFICE VISIT (OUTPATIENT)
Dept: INTERNAL MEDICINE | Facility: CLINIC | Age: 50
End: 2025-06-06
Payer: COMMERCIAL

## 2025-06-06 VITALS
HEART RATE: 64 BPM | WEIGHT: 232.2 LBS | BODY MASS INDEX: 31.45 KG/M2 | TEMPERATURE: 98 F | OXYGEN SATURATION: 99 % | DIASTOLIC BLOOD PRESSURE: 86 MMHG | SYSTOLIC BLOOD PRESSURE: 122 MMHG | HEIGHT: 72 IN

## 2025-06-06 DIAGNOSIS — N50.89 TESTICULAR MASS: Primary | ICD-10-CM

## 2025-06-06 PROCEDURE — 99213 OFFICE O/P EST LOW 20 MIN: CPT | Performed by: NURSE PRACTITIONER

## 2025-06-06 NOTE — PROGRESS NOTES
"Gonzalez Denton is a 49 y.o. male presenting today for   Chief Complaint   Patient presents with    Mass     Lump on his testicle, has a pulling sensation there, first discovered it earlier this week     He presents with his wife who provides history.    Subjective    History of Present Illness     He notes an intermittent \"pulling sensation\" in the R groin/testicle x1wk. In self examination he noted a lump. The lump is not painful. It is dense and fixed. No skin changes or scrotal changes.    The following portions of the patient's history were reviewed and updated as appropriate: allergies, current medications, problem list, past medical history, past surgical history, family history, and social history.    Review of Systems   Genitourinary:  Negative for difficulty urinating, discharge, hematuria, penile pain and erectile dysfunction.         Objective    Vitals:    06/06/25 1050   BP: 122/86   BP Location: Left arm   Patient Position: Sitting   Cuff Size: Large Adult   Pulse: 64   Temp: 98 °F (36.7 °C)   TempSrc: Infrared   SpO2: 99%   Weight: 105 kg (232 lb 3.2 oz)   Height: 182.9 cm (72\")     Body mass index is 31.49 kg/m².  Nursing notes and vitals reviewed.    Physical Exam  Exam conducted with a chaperone present.   Constitutional:       General: He is not in acute distress.     Appearance: He is well-developed.   Pulmonary:      Effort: Pulmonary effort is normal. No respiratory distress.   Abdominal:      Hernia: There is no hernia in the left inguinal area or right inguinal area.   Genitourinary:     Penis: Normal and circumcised.       Testes:         Right: Mass present. Tenderness or swelling not present. Right testis is descended.         Left: Mass, tenderness or swelling not present. Left testis is descended.   Neurological:      Mental Status: He is alert and oriented to person, place, and time.   Psychiatric:         Speech: Speech normal.         Thought Content: Thought content normal. "           Data Reviewed:      Assessment and Plan:         Testicular mass    Orders:    US scrotum and testicles; Future          The plan of care was discussed. All questions were answered. Patient verbalized understanding.

## 2025-06-10 ENCOUNTER — HOSPITAL ENCOUNTER (OUTPATIENT)
Dept: ULTRASOUND IMAGING | Facility: HOSPITAL | Age: 50
Discharge: HOME | End: 2025-06-10
Admitting: NURSE PRACTITIONER
Payer: COMMERCIAL

## 2025-06-10 DIAGNOSIS — N50.89 TESTICULAR MASS: ICD-10-CM

## 2025-06-10 PROCEDURE — 93976 VASCULAR STUDY: CPT

## 2025-06-10 PROCEDURE — 76870 US EXAM SCROTUM: CPT

## 2025-06-24 ENCOUNTER — TELEPHONE (OUTPATIENT)
Dept: INTERNAL MEDICINE | Facility: CLINIC | Age: 50
End: 2025-06-24
Payer: COMMERCIAL

## 2025-06-30 DIAGNOSIS — Z13.0 SCREENING FOR DEFICIENCY ANEMIA: ICD-10-CM

## 2025-06-30 DIAGNOSIS — Z13.220 SCREENING FOR LIPID DISORDERS: ICD-10-CM

## 2025-06-30 DIAGNOSIS — Z00.00 ROUTINE HEALTH MAINTENANCE: Primary | ICD-10-CM

## 2025-06-30 DIAGNOSIS — Z13.29 SCREENING FOR THYROID DISORDER: ICD-10-CM

## 2025-06-30 DIAGNOSIS — Z13.1 SCREENING FOR DIABETES MELLITUS: ICD-10-CM

## 2025-07-14 ENCOUNTER — OFFICE VISIT (OUTPATIENT)
Dept: INTERNAL MEDICINE | Facility: CLINIC | Age: 50
End: 2025-07-14
Payer: COMMERCIAL

## 2025-07-14 VITALS
BODY MASS INDEX: 30.94 KG/M2 | DIASTOLIC BLOOD PRESSURE: 82 MMHG | OXYGEN SATURATION: 98 % | TEMPERATURE: 97.7 F | SYSTOLIC BLOOD PRESSURE: 122 MMHG | HEART RATE: 62 BPM | WEIGHT: 228.4 LBS | HEIGHT: 72 IN

## 2025-07-14 DIAGNOSIS — Z13.6 ENCOUNTER FOR SCREENING FOR CORONARY ARTERY DISEASE: ICD-10-CM

## 2025-07-14 DIAGNOSIS — M50.30 DDD (DEGENERATIVE DISC DISEASE), CERVICAL: ICD-10-CM

## 2025-07-14 DIAGNOSIS — E78.5 MILD HYPERLIPIDEMIA: ICD-10-CM

## 2025-07-14 DIAGNOSIS — Z00.00 ENCOUNTER FOR WELLNESS EXAMINATION IN ADULT: Primary | ICD-10-CM

## 2025-07-14 PROCEDURE — 99396 PREV VISIT EST AGE 40-64: CPT | Performed by: NURSE PRACTITIONER

## 2025-07-14 NOTE — PROGRESS NOTES
"DAVID Roth is a 49 y.o. male presenting for Annual Exam    His current/chronic health conditions include:  Patient Active Problem List   Diagnosis    DDD (degenerative disc disease), cervical    Cervical radiculopathy    Gastroesophageal reflux disease without esophagitis    Pure hypercholesterolemia    Encounter for screening colonoscopy       Outpatient Medications Marked as Taking for the 7/14/25 encounter (Office Visit) with Emely Naidu APRN   Medication Sig Dispense Refill    methocarbamol (Robaxin) 500 MG tablet Take 1 tablet by mouth 4 (Four) Times a Day. (Patient taking differently: Take 1 tablet by mouth 4 (Four) Times a Day As Needed for Muscle Spasms.) 60 tablet 1    omeprazole (priLOSEC) 40 MG capsule TAKE 1 CAPSULE BY MOUTH DAILY 30 capsule 5          Health Habits:  Nutrition: \"I have tried to eat a bit healthier.\" More vegetables. Less carbs  Exercise: \"I do what I can.\" Walking 3 days/wk for 30 minutes.    Screenings:  PSA: n/a  Colon Cancer: CLS in 10/2024 w/ TA and recall in 5yr  Tob use: n/a      He would like to schedule a Cardiac Calcium CT for CAD screening.    He continues to have intermittent cervical back pain. He does PT exercises intermittently.        The patient's allergies, current medications, problem list, past medical history, past family history, and past social history were reviewed and updated as appropriate.    Review of Systems   Constitutional: Negative.    HENT: Negative.     Eyes: Negative.    Respiratory: Negative.     Cardiovascular: Negative.    Gastrointestinal: Negative.    Endocrine: Negative.    Genitourinary: Negative.    Musculoskeletal:  Positive for back pain and neck pain.   Skin: Negative.    Allergic/Immunologic: Negative.    Neurological: Negative.    Hematological: Negative.    Psychiatric/Behavioral: Negative.         OBJECTIVE    Vitals:    07/14/25 0825   BP: 122/82   BP Location: Left arm   Patient Position: Sitting   Cuff Size: Large " "Adult   Pulse: 62   Temp: 97.7 °F (36.5 °C)   TempSrc: Infrared   SpO2: 98%   Weight: 104 kg (228 lb 6.4 oz)   Height: 182.9 cm (72\")       BP Readings from Last 3 Encounters:   07/14/25 122/82   06/06/25 122/86   11/04/24 110/70       Wt Readings from Last 3 Encounters:   07/14/25 104 kg (228 lb 6.4 oz)   06/06/25 105 kg (232 lb 3.2 oz)   11/04/24 103 kg (227 lb 8 oz)       Body mass index is 30.98 kg/m².  Nursing notes and vital signs reviewed.    Physical Exam  Constitutional:       General: He is not in acute distress.     Appearance: Normal appearance. He is well-developed.   HENT:      Head: Normocephalic.      Right Ear: Hearing, tympanic membrane, ear canal and external ear normal.      Left Ear: Hearing, tympanic membrane, ear canal and external ear normal.      Nose: Nose normal. No mucosal edema or rhinorrhea.      Mouth/Throat:      Mouth: Mucous membranes are moist.      Pharynx: Oropharynx is clear. Uvula midline.   Eyes:      General: Lids are normal.      Extraocular Movements: Extraocular movements intact.      Conjunctiva/sclera: Conjunctivae normal.      Pupils: Pupils are equal, round, and reactive to light.   Neck:      Thyroid: No thyroid mass or thyromegaly.   Cardiovascular:      Rate and Rhythm: Regular rhythm.      Pulses: Normal pulses.      Heart sounds: S1 normal and S2 normal. No murmur heard.     No friction rub. No gallop.   Pulmonary:      Effort: Pulmonary effort is normal.      Breath sounds: Normal breath sounds. No wheezing, rhonchi or rales.   Abdominal:      General: Bowel sounds are normal.      Palpations: Abdomen is soft.      Tenderness: There is no abdominal tenderness. There is no guarding.      Hernia: No hernia is present.   Musculoskeletal:         General: No deformity. Normal range of motion.      Cervical back: Normal range of motion and neck supple.   Lymphadenopathy:      Cervical: No cervical adenopathy.   Skin:     General: Skin is warm and dry.      Findings: " No lesion or rash.   Neurological:      General: No focal deficit present.      Mental Status: He is alert and oriented to person, place, and time.      Cranial Nerves: No cranial nerve deficit.      Sensory: No sensory deficit.      Motor: Motor function is intact.      Coordination: Coordination is intact.      Gait: Gait normal.      Deep Tendon Reflexes: Reflexes are normal and symmetric.   Psychiatric:         Attention and Perception: He is attentive.         Mood and Affect: Mood and affect normal.         Speech: Speech normal.         Behavior: Behavior normal.         Thought Content: Thought content normal.           Data Reviewed:    Recent Results (from the past 4 weeks)   CBC (No Diff)    Collection Time: 07/07/25  8:09 AM    Specimen: Blood   Result Value Ref Range    WBC 6.60 3.40 - 10.80 10*3/mm3    RBC 5.25 4.14 - 5.80 10*6/mm3    Hemoglobin 14.6 13.0 - 17.7 g/dL    Hematocrit 44.8 37.5 - 51.0 %    MCV 85.3 79.0 - 97.0 fL    MCH 27.8 26.6 - 33.0 pg    MCHC 32.6 31.5 - 35.7 g/dL    RDW 13.4 12.3 - 15.4 %    Platelets 213 140 - 450 10*3/mm3   Comprehensive Metabolic Panel    Collection Time: 07/07/25  8:09 AM    Specimen: Blood   Result Value Ref Range    Glucose 85 65 - 99 mg/dL    BUN 18.0 6.0 - 20.0 mg/dL    Creatinine 1.08 0.76 - 1.27 mg/dL    EGFR Result 84.6 >60.0 mL/min/1.73    BUN/Creatinine Ratio 16.7 7.0 - 25.0    Sodium 142 136 - 145 mmol/L    Potassium 4.4 3.5 - 5.2 mmol/L    Chloride 105 98 - 107 mmol/L    Total CO2 26.1 22.0 - 29.0 mmol/L    Calcium 9.5 8.6 - 10.5 mg/dL    Total Protein 6.7 6.0 - 8.5 g/dL    Albumin 4.4 3.5 - 5.2 g/dL    Globulin 2.3 gm/dL    A/G Ratio 1.9 g/dL    Total Bilirubin 0.6 0.0 - 1.2 mg/dL    Alkaline Phosphatase 105 39 - 117 U/L    AST (SGOT) 15 1 - 40 U/L    ALT (SGPT) 20 1 - 41 U/L   Lipid Panel With / Chol / HDL Ratio    Collection Time: 07/07/25  8:09 AM    Specimen: Blood   Result Value Ref Range    Total Cholesterol 199 0 - 200 mg/dL    Triglycerides  125 0 - 150 mg/dL    HDL Cholesterol 48 40 - 60 mg/dL    VLDL Cholesterol Shin 22 5 - 40 mg/dL    LDL Chol Calc ([facility]) 129 (H) 0 - 100 mg/dL    Chol/HDL Ratio 4.15    TSH    Collection Time: 07/07/25  8:09 AM    Specimen: Blood   Result Value Ref Range    TSH 1.940 0.270 - 4.200 uIU/mL         Assessment and Plan:       Encounter for wellness examination in adult         Encounter for screening for coronary artery disease    Orders:    CT Cardiac Calcium Score Without Dye; Future    Mild hyperlipidemia  - Patient counseled regarding therapeutic lifestyle changes including improved nutrition, increased exercise, and weight loss.           DDD (degenerative disc disease), cervical  - discussed importance of daily PT exercises               Preventative counseling completed including relevant screenings, appropriate vaccinations, healthy nutrition, and appropriate physical activity. Age-appropriate Screening & Interventions recommended by USPSTF were reviewed with the patient, and Health Maintenance was updated in Epic.              Medications, including side effects, were discussed with the patient. Patient verbalized understanding.  The plan of care was discussed. All questions were answered. Patient verbalized understanding.        Return in about 1 year (around 7/14/2026) for fasting labs one week prior to, Annual physical.

## (undated) DEVICE — CANN O2 ETCO2 FITS ALL CONN CO2 SMPL A/ 7IN DISP LF

## (undated) DEVICE — THE SINGLE USE ETRAP – POLYP TRAP IS USED FOR SUCTION RETRIEVAL OF ENDOSCOPICALLY REMOVED POLYPS.: Brand: ETRAP

## (undated) DEVICE — VIAL FORMLN CAP 10PCT 20ML

## (undated) DEVICE — KT ORCA ORCAPOD DISP STRL

## (undated) DEVICE — BLCK/BITE BLOX W/DENTL/RIM W/STRAP 54F

## (undated) DEVICE — ADAPT CLN SCPE ENDO PORPOISE BX/50 DISP

## (undated) DEVICE — GOWN PROC ENDOARMOR GI LVL3 HY/SHLD UNIV

## (undated) DEVICE — SNAR POLYP CAPTIVATOR RND STFF 2.4 240CM 10MM 1P/U

## (undated) DEVICE — SINGLE-USE BIOPSY FORCEPS: Brand: RADIAL JAW 4

## (undated) DEVICE — FLEX ADVANTAGE 1500CC: Brand: FLEX ADVANTAGE

## (undated) DEVICE — Device